# Patient Record
Sex: MALE | Race: WHITE | URBAN - METROPOLITAN AREA
[De-identification: names, ages, dates, MRNs, and addresses within clinical notes are randomized per-mention and may not be internally consistent; named-entity substitution may affect disease eponyms.]

---

## 2017-10-03 ENCOUNTER — HISTORICAL (OUTPATIENT)
Dept: ADMINISTRATIVE | Facility: HOSPITAL | Age: 47
End: 2017-10-03

## 2017-10-03 LAB
ABS NEUT (OLG): 2.44 X10(3)/MCL (ref 2.1–9.2)
ALBUMIN SERPL-MCNC: 4.7 GM/DL (ref 3.4–5)
ALBUMIN/GLOB SERPL: 1.6 {RATIO}
ALP SERPL-CCNC: 77 UNIT/L (ref 50–136)
ALT SERPL-CCNC: 35 UNIT/L (ref 12–78)
APPEARANCE, UA: CLEAR
AST SERPL-CCNC: 23 UNIT/L (ref 15–37)
BACTERIA SPEC CULT: NORMAL /HPF
BASOPHILS # BLD AUTO: 0 X10(3)/MCL (ref 0–0.2)
BASOPHILS NFR BLD AUTO: 0 %
BILIRUB SERPL-MCNC: 0.7 MG/DL (ref 0.2–1)
BILIRUB UR QL STRIP: NEGATIVE
BILIRUBIN DIRECT+TOT PNL SERPL-MCNC: 0.1 MG/DL (ref 0–0.2)
BILIRUBIN DIRECT+TOT PNL SERPL-MCNC: 0.6 MG/DL (ref 0–0.8)
BUN SERPL-MCNC: 22 MG/DL (ref 7–18)
CALCIUM SERPL-MCNC: 9.6 MG/DL (ref 8.5–10.1)
CHLORIDE SERPL-SCNC: 104 MMOL/L (ref 98–107)
CHOLEST SERPL-MCNC: 230 MG/DL (ref 0–200)
CHOLEST/HDLC SERPL: 4 {RATIO} (ref 0–5)
CO2 SERPL-SCNC: 29 MMOL/L (ref 21–32)
COLOR UR: YELLOW
CREAT SERPL-MCNC: 0.92 MG/DL (ref 0.7–1.3)
EOSINOPHIL # BLD AUTO: 0.1 X10(3)/MCL (ref 0–0.9)
EOSINOPHIL NFR BLD AUTO: 1 %
ERYTHROCYTE [DISTWIDTH] IN BLOOD BY AUTOMATED COUNT: 12.1 % (ref 11.5–17)
GLOBULIN SER-MCNC: 2.9 GM/DL (ref 2.4–3.5)
GLUCOSE (UA): NEGATIVE
GLUCOSE SERPL-MCNC: 94 MG/DL (ref 74–106)
HCT VFR BLD AUTO: 51.2 % (ref 42–52)
HDLC SERPL-MCNC: 57 MG/DL (ref 35–60)
HGB BLD-MCNC: 17.8 GM/DL (ref 14–18)
HGB UR QL STRIP: NEGATIVE
KETONES UR QL STRIP: NEGATIVE
LDLC SERPL CALC-MCNC: 158 MG/DL (ref 0–129)
LEUKOCYTE ESTERASE UR QL STRIP: NEGATIVE
LYMPHOCYTES # BLD AUTO: 1.6 X10(3)/MCL (ref 0.6–4.6)
LYMPHOCYTES NFR BLD AUTO: 35 %
MCH RBC QN AUTO: 30.3 PG (ref 27–31)
MCHC RBC AUTO-ENTMCNC: 34.8 GM/DL (ref 33–36)
MCV RBC AUTO: 87.1 FL (ref 80–94)
MONOCYTES # BLD AUTO: 0.4 X10(3)/MCL (ref 0.1–1.3)
MONOCYTES NFR BLD AUTO: 8 %
NEUTROPHILS # BLD AUTO: 2.44 X10(3)/MCL (ref 1.4–7.9)
NEUTROPHILS NFR BLD AUTO: 54 %
NITRITE UR QL STRIP: NEGATIVE
PH UR STRIP: 5.5 [PH] (ref 5–9)
PLATELET # BLD AUTO: 156 X10(3)/MCL (ref 130–400)
PMV BLD AUTO: 10.3 FL (ref 9.4–12.4)
POTASSIUM SERPL-SCNC: 4.4 MMOL/L (ref 3.5–5.1)
PROT SERPL-MCNC: 7.6 GM/DL (ref 6.4–8.2)
PROT UR QL STRIP: NEGATIVE
RBC # BLD AUTO: 5.88 X10(6)/MCL (ref 4.7–6.1)
RBC #/AREA URNS HPF: NORMAL /[HPF]
SODIUM SERPL-SCNC: 139 MMOL/L (ref 136–145)
SP GR UR STRIP: 1.02 (ref 1–1.03)
SQUAMOUS EPITHELIAL, UA: NORMAL
TRIGL SERPL-MCNC: 74 MG/DL (ref 30–150)
TSH SERPL-ACNC: 0.76 MIU/ML (ref 0.36–3.74)
UROBILINOGEN UR STRIP-ACNC: 0.2
VLDLC SERPL CALC-MCNC: 15 MG/DL
WBC # SPEC AUTO: 4.5 X10(3)/MCL (ref 4.5–11.5)
WBC #/AREA URNS HPF: NORMAL /HPF

## 2022-04-10 ENCOUNTER — HISTORICAL (OUTPATIENT)
Dept: ADMINISTRATIVE | Facility: HOSPITAL | Age: 52
End: 2022-04-10

## 2022-04-26 VITALS
DIASTOLIC BLOOD PRESSURE: 89 MMHG | OXYGEN SATURATION: 100 % | HEIGHT: 67 IN | SYSTOLIC BLOOD PRESSURE: 132 MMHG | WEIGHT: 170 LBS | BODY MASS INDEX: 26.68 KG/M2

## 2024-08-08 ENCOUNTER — HOSPITAL ENCOUNTER (EMERGENCY)
Facility: HOSPITAL | Age: 54
Discharge: HOME | End: 2024-08-08
Attending: FAMILY MEDICINE
Payer: COMMERCIAL

## 2024-08-08 VITALS
HEIGHT: 67 IN | WEIGHT: 160 LBS | DIASTOLIC BLOOD PRESSURE: 74 MMHG | TEMPERATURE: 97 F | SYSTOLIC BLOOD PRESSURE: 138 MMHG | RESPIRATION RATE: 18 BRPM | OXYGEN SATURATION: 99 % | BODY MASS INDEX: 25.11 KG/M2 | HEART RATE: 58 BPM

## 2024-08-08 DIAGNOSIS — N20.1 LEFT URETERAL STONE: Primary | ICD-10-CM

## 2024-08-08 LAB
AMPHET UR QL SCN: NEGATIVE
ANION GAP SERPL CALC-SCNC: 11 MEQ/L
BACTERIA #/AREA URNS AUTO: ABNORMAL /HPF
BARBITURATE SCN PRESENT UR: NEGATIVE
BASOPHILS # BLD AUTO: 0.01 X10(3)/MCL
BASOPHILS NFR BLD AUTO: 0.1 %
BENZODIAZ UR QL SCN: NEGATIVE
BILIRUB UR QL STRIP.AUTO: NEGATIVE
BUN SERPL-MCNC: 13.4 MG/DL (ref 8.4–25.7)
CALCIUM SERPL-MCNC: 10.7 MG/DL (ref 8.4–10.2)
CANNABINOIDS UR QL SCN: NEGATIVE
CHLORIDE SERPL-SCNC: 103 MMOL/L (ref 98–107)
CLARITY UR: CLEAR
CO2 SERPL-SCNC: 28 MMOL/L (ref 22–29)
COCAINE UR QL SCN: NEGATIVE
COLOR UR AUTO: YELLOW
CREAT SERPL-MCNC: 0.99 MG/DL (ref 0.73–1.18)
CREAT/UREA NIT SERPL: 14
CRP SERPL HS-MCNC: 1.47 MG/L
EOSINOPHIL # BLD AUTO: 0.01 X10(3)/MCL (ref 0–0.9)
EOSINOPHIL NFR BLD AUTO: 0.1 %
ERYTHROCYTE [DISTWIDTH] IN BLOOD BY AUTOMATED COUNT: 11.9 % (ref 11.5–17)
GFR SERPLBLD CREATININE-BSD FMLA CKD-EPI: >60 ML/MIN/1.73/M2
GLUCOSE SERPL-MCNC: 174 MG/DL (ref 74–100)
GLUCOSE UR QL STRIP: 100
HCT VFR BLD AUTO: 47.9 % (ref 42–52)
HGB BLD-MCNC: 16.1 G/DL (ref 14–18)
HGB UR QL STRIP: ABNORMAL
IMM GRANULOCYTES # BLD AUTO: 0.01 X10(3)/MCL (ref 0–0.04)
IMM GRANULOCYTES NFR BLD AUTO: 0.1 %
KETONES UR QL STRIP: ABNORMAL
LEUKOCYTE ESTERASE UR QL STRIP: NEGATIVE
LYMPHOCYTES # BLD AUTO: 0.58 X10(3)/MCL (ref 0.6–4.6)
LYMPHOCYTES NFR BLD AUTO: 6.2 %
MCH RBC QN AUTO: 29.1 PG (ref 27–31)
MCHC RBC AUTO-ENTMCNC: 33.6 G/DL (ref 33–36)
MCV RBC AUTO: 86.6 FL (ref 80–94)
MONOCYTES # BLD AUTO: 0.34 X10(3)/MCL (ref 0.1–1.3)
MONOCYTES NFR BLD AUTO: 3.6 %
NEUTROPHILS # BLD AUTO: 8.44 X10(3)/MCL (ref 2.1–9.2)
NEUTROPHILS NFR BLD AUTO: 89.9 %
NITRITE UR QL STRIP: NEGATIVE
OPIATES UR QL SCN: NEGATIVE
PCP UR QL: NEGATIVE
PH UR STRIP: 6 [PH]
PH UR: 6 [PH] (ref 3–11)
PLATELET # BLD AUTO: 166 X10(3)/MCL (ref 130–400)
PMV BLD AUTO: 9.9 FL (ref 7.4–10.4)
POTASSIUM SERPL-SCNC: 4.4 MMOL/L (ref 3.5–5.1)
PROT UR QL STRIP: NEGATIVE
RBC # BLD AUTO: 5.53 X10(6)/MCL (ref 4.7–6.1)
RBC #/AREA URNS AUTO: ABNORMAL /HPF
SODIUM SERPL-SCNC: 142 MMOL/L (ref 136–145)
SP GR UR STRIP.AUTO: 1.02 (ref 1–1.03)
SPECIFIC GRAVITY, URINE AUTO (.000) (OHS): 1.02 (ref 1–1.03)
SQUAMOUS #/AREA URNS AUTO: ABNORMAL /HPF
UROBILINOGEN UR STRIP-ACNC: 0.2
WBC # BLD AUTO: 9.39 X10(3)/MCL (ref 4.5–11.5)
WBC #/AREA URNS AUTO: ABNORMAL /HPF

## 2024-08-08 PROCEDURE — 80307 DRUG TEST PRSMV CHEM ANLYZR: CPT | Performed by: FAMILY MEDICINE

## 2024-08-08 PROCEDURE — 86141 C-REACTIVE PROTEIN HS: CPT | Performed by: FAMILY MEDICINE

## 2024-08-08 PROCEDURE — 81015 MICROSCOPIC EXAM OF URINE: CPT | Performed by: FAMILY MEDICINE

## 2024-08-08 PROCEDURE — 63600175 PHARM REV CODE 636 W HCPCS: Performed by: FAMILY MEDICINE

## 2024-08-08 PROCEDURE — 25000003 PHARM REV CODE 250: Performed by: FAMILY MEDICINE

## 2024-08-08 PROCEDURE — 80048 BASIC METABOLIC PNL TOTAL CA: CPT | Performed by: FAMILY MEDICINE

## 2024-08-08 PROCEDURE — 85025 COMPLETE CBC W/AUTO DIFF WBC: CPT | Performed by: FAMILY MEDICINE

## 2024-08-08 PROCEDURE — 99285 EMERGENCY DEPT VISIT HI MDM: CPT | Mod: 25

## 2024-08-08 PROCEDURE — 96374 THER/PROPH/DIAG INJ IV PUSH: CPT

## 2024-08-08 PROCEDURE — 96375 TX/PRO/DX INJ NEW DRUG ADDON: CPT

## 2024-08-08 PROCEDURE — 81003 URINALYSIS AUTO W/O SCOPE: CPT | Mod: 59 | Performed by: FAMILY MEDICINE

## 2024-08-08 PROCEDURE — 96361 HYDRATE IV INFUSION ADD-ON: CPT

## 2024-08-08 RX ORDER — TAMSULOSIN HYDROCHLORIDE 0.4 MG/1
0.4 CAPSULE ORAL DAILY
Qty: 10 CAPSULE | Refills: 0 | Status: SHIPPED | OUTPATIENT
Start: 2024-08-08 | End: 2025-08-08

## 2024-08-08 RX ORDER — ONDANSETRON 4 MG/1
4 TABLET, FILM COATED ORAL EVERY 6 HOURS
Qty: 12 TABLET | Refills: 0 | Status: SHIPPED | OUTPATIENT
Start: 2024-08-08

## 2024-08-08 RX ORDER — KETOROLAC TROMETHAMINE 30 MG/ML
30 INJECTION, SOLUTION INTRAMUSCULAR; INTRAVENOUS
Status: COMPLETED | OUTPATIENT
Start: 2024-08-08 | End: 2024-08-08

## 2024-08-08 RX ORDER — ONDANSETRON HYDROCHLORIDE 2 MG/ML
4 INJECTION, SOLUTION INTRAVENOUS
Status: COMPLETED | OUTPATIENT
Start: 2024-08-08 | End: 2024-08-08

## 2024-08-08 RX ORDER — KETOROLAC TROMETHAMINE 10 MG/1
10 TABLET, FILM COATED ORAL EVERY 6 HOURS
Qty: 15 TABLET | Refills: 0 | Status: SHIPPED | OUTPATIENT
Start: 2024-08-08 | End: 2024-08-13

## 2024-08-08 RX ADMIN — KETOROLAC TROMETHAMINE 30 MG: 30 INJECTION, SOLUTION INTRAMUSCULAR at 06:08

## 2024-08-08 RX ADMIN — SODIUM CHLORIDE 1000 ML: 9 INJECTION, SOLUTION INTRAVENOUS at 06:08

## 2024-08-08 RX ADMIN — ONDANSETRON 4 MG: 2 INJECTION INTRAMUSCULAR; INTRAVENOUS at 06:08

## 2024-08-08 NOTE — ED PROVIDER NOTES
Encounter Date: 8/8/2024       History     Chief Complaint   Patient presents with    Abdominal Pain     Patient c/o LLQ abdominal pain since yesterday. States he vomited 9 times since 11pm last night. Describes pain as stabbing and intermittent, rates pain 6/10 in triage. Took 1g Tylenol at 0530 this AM PTA. Denies fevers, dysuria and blood in urine.     54-year-old gentleman complains of severe left lower quad pain since yesterday also left flank pain pain has been colicky said vomiting x9 no fevers no chills no history of diverticulitis no history of kidney stones vital signs are stable on exam has no rebound no guarding some mild tenderness in his left flank differential would be renal colic from a kidney stone pyelo diverticulitis COVID and get some blood work CT stone pain control antiemetics IV        Review of patient's allergies indicates:  No Known Allergies  History reviewed. No pertinent past medical history.  History reviewed. No pertinent surgical history.  No family history on file.     Review of Systems   Constitutional:  Negative for fever.   HENT:  Negative for sore throat.    Respiratory:  Negative for shortness of breath.    Cardiovascular:  Negative for chest pain.   Gastrointestinal:  Positive for abdominal pain. Negative for nausea.   Genitourinary:  Positive for flank pain. Negative for dysuria.   Musculoskeletal:  Negative for back pain.   Skin:  Negative for rash.   Neurological:  Negative for weakness.   Hematological:  Does not bruise/bleed easily.   All other systems reviewed and are negative.      Physical Exam     Initial Vitals [08/08/24 0614]   BP Pulse Resp Temp SpO2   (!) 147/87 (!) 51 18 97.3 °F (36.3 °C) 98 %      MAP       --         Physical Exam    Nursing note and vitals reviewed.  Constitutional: He appears well-developed and well-nourished. He is active.   HENT:   Head: Normocephalic and atraumatic.   Eyes: Conjunctivae, EOM and lids are normal. Pupils are equal, round, and  UNM Children's Hospital Benjamín Gonzalez     reactive to light.   Neck: Trachea normal and phonation normal. Neck supple. No thyroid mass present.   Normal range of motion.  Cardiovascular:  Normal rate, regular rhythm, normal heart sounds and normal pulses.           Pulmonary/Chest: Breath sounds normal.   Abdominal: There is abdominal tenderness. There is no rebound and no guarding.   Musculoskeletal:         General: Normal range of motion.      Cervical back: Normal range of motion and neck supple.     Neurological: He is alert and oriented to person, place, and time. He has normal reflexes.   Skin: Skin is warm and intact.   Psychiatric: He has a normal mood and affect. His speech is normal and behavior is normal. Judgment and thought content normal. Cognition and memory are normal.         ED Course   Procedures  Labs Reviewed   URINALYSIS, REFLEX TO URINE CULTURE - Abnormal       Result Value    Color, UA Yellow      Appearance, UA Clear      Specific Gravity, UA 1.025      pH, UA 6.0      Protein, UA Negative      Glucose,  (*)     Ketones, UA Trace (*)     Blood, UA Large (*)     Bilirubin, UA Negative      Urobilinogen, UA 0.2      Nitrites, UA Negative      Leukocyte Esterase, UA Negative     BASIC METABOLIC PANEL - Abnormal    Sodium 142      Potassium 4.4      Chloride 103      CO2 28      Glucose 174 (*)     Blood Urea Nitrogen 13.4      Creatinine 0.99      BUN/Creatinine Ratio 14      Calcium 10.7 (*)     Anion Gap 11.0      eGFR >60     CBC WITH DIFFERENTIAL - Abnormal    WBC 9.39      RBC 5.53      Hgb 16.1      Hct 47.9      MCV 86.6      MCH 29.1      MCHC 33.6      RDW 11.9      Platelet 166      MPV 9.9      Neut % 89.9      Lymph % 6.2      Mono % 3.6      Eos % 0.1      Basophil % 0.1      Lymph # 0.58 (*)     Neut # 8.44      Mono # 0.34      Eos # 0.01      Baso # 0.01      IG# 0.01      IG% 0.1     URINALYSIS, MICROSCOPIC - Abnormal    Bacteria, UA None Seen      RBC, UA 50-99 (*)     WBC, UA None Seen      Squamous Epithelial  Cells, UA None Seen     HIGH SENSITIVITY CRP - Normal    C-Reactive Protein High Sensitivity 1.47     DRUG SCREEN, URINE (BEAKER) - Normal    Amphetamines, Urine Negative      Barbiturates, Urine Negative      Benzodiazepine, Urine Negative      Cannabinoids, Urine Negative      Cocaine, Urine Negative      Opiates, Urine Negative      Phencyclidine, Urine Negative      pH, Urine 6.0      Specific Gravity, Urine Auto 1.025      Narrative:     Cut off concentrations:    Amphetamines - 1000 ng/ml  Barbiturates - 200 ng/ml  Benzodiazepine - 200 ng/ml  Cannabinoids (THC) - 50 ng/ml  Cocaine - 300 ng/ml  Fentanyl - 1.0 ng/ml  MDMA - 500 ng/ml  Opiates - 300 ng/ml   Phencyclidine (PCP) - 25 ng/ml    Specimen submitted for drug analysis and tested for pH and specific gravity in order to evaluate sample integrity. Suspect tampering if specific gravity is <1.003 and/or pH is not within the range of 4.5 - 8.0  False negatives may result form substances such as bleach added to urine.  False positives may result for the presence of a substance with similar chemical structure to the drug or its metabolite.    This test provides only a PRELIMINARY analytical test result. A more specific alternate chemical method must be used in order to obtain a confirmed analytical result. Gas chromatography/mass spectrometry (GC/MS) is the preferred confirmatory method. Other chemical confirmation methods are available. Clinical consideration and professional judgement should be applied to any drug of abuse test result, particularly when preliminary positive results are used.    Positive results will be confirmed only at the physicians request. Unconfirmed screening results are to be used only for medical purposes (treatment).        CBC W/ AUTO DIFFERENTIAL    Narrative:     The following orders were created for panel order CBC Auto Differential.  Procedure                               Abnormality         Status                     ---------                                -----------         ------                     CBC with Differential[[id number]]       Abnormal            Final result                 Please view results for these tests on the individual orders.          Imaging Results              CT Renal Stone Study ABD Pelvis WO (Final result)  Result time 08/08/24 07:10:26      Final result by Zakiya Batres MD (08/08/24 07:10:26)                   Impression:      1. 3 mm calculus in the distal left ureter with mild left hydronephrosis and perinephric inflammatory changes.  2. Bowel containing right inguinal hernia without obstruction.      Electronically signed by: Zakiya Batres  Date:    08/08/2024  Time:    07:10               Narrative:    EXAMINATION:  CT RENAL STONE STUDY ABD PELVIS WO    CLINICAL HISTORY:  Flank pain, kidney stone suspected;    TECHNIQUE:  CT imaging was performed of the abdomen and pelvis without intravenous contrast. Dose length product is 484 mGycm. Automatic exposure control, adjustment of mA/kV or iterative reconstruction technique was used to limit radiation dose.    COMPARISON:  None    FINDINGS:  Assessment of the visceral organs and vasculature is limited by the lack of IV contrast.    Liver/biliary: No concerning hepatic findings. No radiodense gallstone or biliary dilatation appreciated.    Pancreas: Normal.    Spleen: Normal.    Adrenals: Normal.    Kidneys, ureters and bladder: There is a 3 mm calculus in the distal left ureter.  There is mild left hydronephrosis with perinephric stranding.  The bladder is within normal limits.    Reproductive organs: No pelvic masses.    Stomach/bowel: No evidence of bowel obstruction. Normal appendix. No discernible bowel inflammation.    Lymph nodes: No pathologically enlarged lymph node identified with noncontrast technique.    Peritoneum: No ascites or free air.    Vessels: Mild scattered vascular calcifications.    Abdominal wall: Right inguinal hernia  containing a loop of the terminal ileum.    Lung bases: No consolidation or pleural effusion.    Bones: No acute osseous findings.                                       Medications   sodium chloride 0.9% bolus 1,000 mL 1,000 mL (1,000 mLs Intravenous New Bag 8/8/24 0640)   ondansetron injection 4 mg (4 mg Intravenous Given 8/8/24 0640)   ketorolac injection 30 mg (30 mg Intravenous Given 8/8/24 0641)     Medical Decision Making  54-year-old gentleman complains of severe left lower quad pain since yesterday also left flank pain pain has been colicky said vomiting x9 no fevers no chills no history of diverticulitis no history of kidney stones vital signs are stable on exam has no rebound no guarding some mild tenderness in his left flank differential would be renal colic from a kidney stone pyelo diverticulitis COVID and get some blood work CT stone pain control antiemetics IV      Discussed all findings and treatment plan with the patient    Amount and/or Complexity of Data Reviewed  Labs: ordered. Decision-making details documented in ED Course.  Radiology: ordered and independent interpretation performed.    Risk  Prescription drug management.  Risk Details: Differential diagnosis diverticulitis kidney stone pyelo obstruction               ED Course as of 08/08/24 0755   Thu Aug 08, 2024   0639 NITRITE UA: Negative [BL]   0639 Leukocyte Esterase, UA: Negative [BL]   0639 WBC: 9.39 [BL]   0639 RBC: 5.53 [BL]   0639 Hematocrit: 47.9 [BL]   0643 CRP, High Sensitivity: 1.47 [BL]   0643 Glucose(!): 174 [BL]   0643 BUN: 13.4 [BL]   0643 Creatinine: 0.99 [BL]      ED Course User Index  [BL] Chidi Head MD                           Clinical Impression:  Final diagnoses:  [N20.1] Left ureteral stone (Primary)          ED Disposition Condition    Discharge Stable          ED Prescriptions       Medication Sig Dispense Start Date End Date Auth. Provider    ketorolac (TORADOL) 10 mg tablet Take 1 tablet (10 mg total)  by mouth every 6 (six) hours. for 5 days 15 tablet 8/8/2024 8/13/2024 Chidi Head MD    ondansetron (ZOFRAN) 4 MG tablet Take 1 tablet (4 mg total) by mouth every 6 (six) hours. 12 tablet 8/8/2024 -- Chidi Head MD    tamsulosin (FLOMAX) 0.4 mg Cap Take 1 capsule (0.4 mg total) by mouth once daily. 10 capsule 8/8/2024 8/8/2025 Chidi Head MD          Follow-up Information       Follow up With Specialties Details Why Contact Info    Mirlande Zarate, [facility] In 2 days  [address] [facility] [place] [address]  [phone]               Chidi Head MD  08/08/24 4726

## 2024-09-01 ENCOUNTER — HOSPITAL ENCOUNTER (EMERGENCY)
Facility: HOSPITAL | Age: 54
Discharge: ANOTHER HOSPITAL | End: 2024-09-01
Attending: EMERGENCY MEDICINE
Payer: COMMERCIAL

## 2024-09-01 ENCOUNTER — HOSPITAL ENCOUNTER (INPATIENT)
Facility: HOSPITAL | Age: 54
LOS: 5 days | Discharge: HOME | DRG: 356 | End: 2024-09-06
Attending: INTERNAL MEDICINE | Admitting: INTERNAL MEDICINE
Payer: COMMERCIAL

## 2024-09-01 VITALS
TEMPERATURE: 99 F | BODY MASS INDEX: 25.9 KG/M2 | HEIGHT: 67 IN | HEART RATE: 102 BPM | DIASTOLIC BLOOD PRESSURE: 76 MMHG | RESPIRATION RATE: 16 BRPM | WEIGHT: 165 LBS | OXYGEN SATURATION: 98 % | SYSTOLIC BLOOD PRESSURE: 114 MMHG

## 2024-09-01 DIAGNOSIS — K62.5 RECTAL BLEEDING: Primary | ICD-10-CM

## 2024-09-01 DIAGNOSIS — K92.2 GIB (GASTROINTESTINAL BLEEDING): ICD-10-CM

## 2024-09-01 DIAGNOSIS — D62 ACUTE BLOOD LOSS ANEMIA: Primary | ICD-10-CM

## 2024-09-01 LAB
ABORH RETYPE: NORMAL
ALBUMIN SERPL-MCNC: 3.6 G/DL (ref 3.5–5)
ALBUMIN/GLOB SERPL: 1.7 RATIO (ref 1.1–2)
ALP SERPL-CCNC: 48 UNIT/L (ref 40–150)
ALT SERPL-CCNC: 15 UNIT/L (ref 0–55)
ANION GAP SERPL CALC-SCNC: 9 MEQ/L
AST SERPL-CCNC: 14 UNIT/L (ref 5–34)
BASOPHILS # BLD AUTO: 0.03 X10(3)/MCL
BASOPHILS NFR BLD AUTO: 0.4 %
BILIRUB SERPL-MCNC: 0.4 MG/DL
BUN SERPL-MCNC: 35.9 MG/DL (ref 8.4–25.7)
CALCIUM SERPL-MCNC: 8.9 MG/DL (ref 8.4–10.2)
CHLORIDE SERPL-SCNC: 108 MMOL/L (ref 98–107)
CO2 SERPL-SCNC: 22 MMOL/L (ref 22–29)
COLOR STL: ABNORMAL
CONSISTENCY STL: ABNORMAL
CREAT SERPL-MCNC: 0.86 MG/DL (ref 0.73–1.18)
CREAT/UREA NIT SERPL: 42
EOSINOPHIL # BLD AUTO: 0.01 X10(3)/MCL (ref 0–0.9)
EOSINOPHIL NFR BLD AUTO: 0.1 %
ERYTHROCYTE [DISTWIDTH] IN BLOOD BY AUTOMATED COUNT: 12.4 % (ref 11.5–17)
GFR SERPLBLD CREATININE-BSD FMLA CKD-EPI: >60 ML/MIN/1.73/M2
GLOBULIN SER-MCNC: 2.1 GM/DL (ref 2.4–3.5)
GLUCOSE SERPL-MCNC: 132 MG/DL (ref 74–100)
GROUP & RH: NORMAL
GROUP & RH: NORMAL
HCT VFR BLD AUTO: 21.2 % (ref 42–52)
HCT VFR BLD AUTO: 29.2 % (ref 42–52)
HEMOCCULT SP1 STL QL: POSITIVE
HGB BLD-MCNC: 7.2 G/DL (ref 14–18)
HGB BLD-MCNC: 9.6 G/DL (ref 14–18)
IMM GRANULOCYTES # BLD AUTO: 0.01 X10(3)/MCL (ref 0–0.04)
IMM GRANULOCYTES NFR BLD AUTO: 0.1 %
INDIRECT COOMBS: NORMAL
INDIRECT COOMBS: NORMAL
INR PPP: 1.1
LYMPHOCYTES # BLD AUTO: 2.12 X10(3)/MCL (ref 0.6–4.6)
LYMPHOCYTES NFR BLD AUTO: 26 %
MCH RBC QN AUTO: 29.1 PG (ref 27–31)
MCHC RBC AUTO-ENTMCNC: 32.9 G/DL (ref 33–36)
MCV RBC AUTO: 88.5 FL (ref 80–94)
MONOCYTES # BLD AUTO: 0.46 X10(3)/MCL (ref 0.1–1.3)
MONOCYTES NFR BLD AUTO: 5.7 %
NEUTROPHILS # BLD AUTO: 5.51 X10(3)/MCL (ref 2.1–9.2)
NEUTROPHILS NFR BLD AUTO: 67.7 %
PLATELET # BLD AUTO: 219 X10(3)/MCL (ref 130–400)
PMV BLD AUTO: 10.3 FL (ref 7.4–10.4)
POCT GLUCOSE: 192 MG/DL (ref 70–110)
POTASSIUM SERPL-SCNC: 3.7 MMOL/L (ref 3.5–5.1)
PROT SERPL-MCNC: 5.7 GM/DL (ref 6.4–8.3)
PROTHROMBIN TIME: 10.9 SECONDS (ref 12.5–14.5)
RBC # BLD AUTO: 3.3 X10(6)/MCL (ref 4.7–6.1)
SODIUM SERPL-SCNC: 139 MMOL/L (ref 136–145)
SPECIMEN OUTDATE: NORMAL
SPECIMEN OUTDATE: NORMAL
WBC # BLD AUTO: 8.14 X10(3)/MCL (ref 4.5–11.5)

## 2024-09-01 PROCEDURE — 85610 PROTHROMBIN TIME: CPT | Performed by: EMERGENCY MEDICINE

## 2024-09-01 PROCEDURE — 80053 COMPREHEN METABOLIC PANEL: CPT | Performed by: EMERGENCY MEDICINE

## 2024-09-01 PROCEDURE — 36415 COLL VENOUS BLD VENIPUNCTURE: CPT | Performed by: INTERNAL MEDICINE

## 2024-09-01 PROCEDURE — P9016 RBC LEUKOCYTES REDUCED: HCPCS | Performed by: INTERNAL MEDICINE

## 2024-09-01 PROCEDURE — 86901 BLOOD TYPING SEROLOGIC RH(D): CPT | Mod: 91 | Performed by: INTERNAL MEDICINE

## 2024-09-01 PROCEDURE — 86900 BLOOD TYPING SEROLOGIC ABO: CPT | Mod: 91 | Performed by: INTERNAL MEDICINE

## 2024-09-01 PROCEDURE — 96361 HYDRATE IV INFUSION ADD-ON: CPT

## 2024-09-01 PROCEDURE — 82270 OCCULT BLOOD FECES: CPT | Performed by: STUDENT IN AN ORGANIZED HEALTH CARE EDUCATION/TRAINING PROGRAM

## 2024-09-01 PROCEDURE — 86923 COMPATIBILITY TEST ELECTRIC: CPT | Performed by: INTERNAL MEDICINE

## 2024-09-01 PROCEDURE — 30233N1 TRANSFUSION OF NONAUTOLOGOUS RED BLOOD CELLS INTO PERIPHERAL VEIN, PERCUTANEOUS APPROACH: ICD-10-PCS | Performed by: INTERNAL MEDICINE

## 2024-09-01 PROCEDURE — 85025 COMPLETE CBC W/AUTO DIFF WBC: CPT | Performed by: EMERGENCY MEDICINE

## 2024-09-01 PROCEDURE — 85014 HEMATOCRIT: CPT | Performed by: INTERNAL MEDICINE

## 2024-09-01 PROCEDURE — 11000001 HC ACUTE MED/SURG PRIVATE ROOM

## 2024-09-01 PROCEDURE — 63600175 PHARM REV CODE 636 W HCPCS: Performed by: STUDENT IN AN ORGANIZED HEALTH CARE EDUCATION/TRAINING PROGRAM

## 2024-09-01 PROCEDURE — 96374 THER/PROPH/DIAG INJ IV PUSH: CPT

## 2024-09-01 PROCEDURE — 36430 TRANSFUSION BLD/BLD COMPNT: CPT

## 2024-09-01 PROCEDURE — 63600175 PHARM REV CODE 636 W HCPCS: Performed by: INTERNAL MEDICINE

## 2024-09-01 PROCEDURE — 25500020 PHARM REV CODE 255: Performed by: EMERGENCY MEDICINE

## 2024-09-01 PROCEDURE — 99285 EMERGENCY DEPT VISIT HI MDM: CPT | Mod: 25

## 2024-09-01 PROCEDURE — 86900 BLOOD TYPING SEROLOGIC ABO: CPT | Performed by: EMERGENCY MEDICINE

## 2024-09-01 RX ORDER — PANTOPRAZOLE SODIUM 40 MG/10ML
80 INJECTION, POWDER, LYOPHILIZED, FOR SOLUTION INTRAVENOUS
Status: COMPLETED | OUTPATIENT
Start: 2024-09-01 | End: 2024-09-01

## 2024-09-01 RX ORDER — ONDANSETRON HYDROCHLORIDE 2 MG/ML
4 INJECTION, SOLUTION INTRAVENOUS EVERY 8 HOURS PRN
Status: DISCONTINUED | OUTPATIENT
Start: 2024-09-02 | End: 2024-09-06 | Stop reason: HOSPADM

## 2024-09-01 RX ORDER — HYDROCODONE BITARTRATE AND ACETAMINOPHEN 500; 5 MG/1; MG/1
TABLET ORAL
Status: DISCONTINUED | OUTPATIENT
Start: 2024-09-01 | End: 2024-09-03

## 2024-09-01 RX ORDER — ONDANSETRON HYDROCHLORIDE 2 MG/ML
4 INJECTION, SOLUTION INTRAVENOUS EVERY 6 HOURS PRN
Status: DISCONTINUED | OUTPATIENT
Start: 2024-09-01 | End: 2024-09-06 | Stop reason: HOSPADM

## 2024-09-01 RX ORDER — SODIUM CHLORIDE 0.9 % (FLUSH) 0.9 %
10 SYRINGE (ML) INJECTION
Status: DISCONTINUED | OUTPATIENT
Start: 2024-09-02 | End: 2024-09-06 | Stop reason: HOSPADM

## 2024-09-01 RX ORDER — TALC
6 POWDER (GRAM) TOPICAL NIGHTLY PRN
Status: DISCONTINUED | OUTPATIENT
Start: 2024-09-02 | End: 2024-09-06 | Stop reason: HOSPADM

## 2024-09-01 RX ADMIN — ONDANSETRON 4 MG: 2 INJECTION INTRAMUSCULAR; INTRAVENOUS at 10:09

## 2024-09-01 RX ADMIN — IOHEXOL 100 ML: 350 INJECTION, SOLUTION INTRAVENOUS at 06:09

## 2024-09-01 RX ADMIN — SODIUM CHLORIDE, POTASSIUM CHLORIDE, SODIUM LACTATE AND CALCIUM CHLORIDE 1000 ML: 600; 310; 30; 20 INJECTION, SOLUTION INTRAVENOUS at 06:09

## 2024-09-01 RX ADMIN — PANTOPRAZOLE SODIUM 80 MG: 40 INJECTION, POWDER, LYOPHILIZED, FOR SOLUTION INTRAVENOUS at 06:09

## 2024-09-01 NOTE — ED PROVIDER NOTES
NAME:  Tony Zarate  CSN:     [id number]  MRN:    [id number]  ADMIT DATE: 9/1/2024        eMERGENCY dEPARTMENT eNCOUnter    CHIEF COMPLAINT    Chief Complaint   Patient presents with    Rectal Bleeding     Pt reports of passing out 3 x today, blood in stool.        HPI      Tony Zarate is a 54 y.o. male who presents to the ED for rectal bleeding.  Patient reports it started approximately 4:00 a.m. in the morning.  He states multiple episodes of bloody stool.  He also reports passing out 3 times a day.  He denies any fevers or abdominal pain.  He was not on blood thinners.  Patient was never experienced similar episode in the past.          ALLERGIES    Review of patient's allergies indicates:  No Known Allergies    PAST MEDICAL HISTORY  No past medical history on file.    SURGICAL HISTORY    No past surgical history on file.    SOCIAL HISTORY    Social History     Socioeconomic History    Marital status:        FAMILY HISTORY    No family history on file.    REVIEW OF SYSTEMS   Review of Systems   Constitutional:  Negative for chills, fever and weight loss.   HENT:  Negative for congestion and sinus pain.    Eyes:  Negative for blurred vision and double vision.   Respiratory:  Negative for cough and shortness of breath.    Cardiovascular:  Negative for chest pain, palpitations and leg swelling.   Gastrointestinal:  Positive for blood in stool. Negative for abdominal pain, constipation, diarrhea, heartburn, nausea and vomiting.   Genitourinary:  Negative for dysuria and flank pain.   Neurological:  Positive for loss of consciousness. Negative for dizziness, seizures and headaches.   Psychiatric/Behavioral:  Negative for depression and substance abuse. The patient is not nervous/anxious.      All Systems otherwise negative except as noted in the History of Present Illness.        PHYSICAL EXAM    Reviewed Triage Note  VITAL SIGNS:   ED Triage Vitals [09/01/24 1720]   Enc Vitals Group      /75       "Pulse 90      Resp 20      Temp 98.5 °F (36.9 °C)      Temp src       SpO2 100 %      Weight 165 lb      Height 5' 7"      Head Circumference       Peak Flow       Pain Score       Pain Loc       Pain Education       Exclude from Growth Chart        Patient Vitals for the past 24 hrs:   BP Temp Pulse Resp SpO2 Height Weight   09/01/24 2101 114/76 -- 102 16 98 % -- --   09/01/24 1902 108/75 -- 87 14 100 % -- --   09/01/24 1836 137/77 -- 90 14 100 % -- --   09/01/24 1743 108/80 -- 94 16 98 % -- --   09/01/24 1720 114/75 98.5 °F (36.9 °C) 90 20 100 % 5' 7" (1.702 m) 74.8 kg (165 lb)           Physical Exam  Vitals reviewed.   Constitutional:       General: He is not in acute distress.     Appearance: He is normal weight. He is not ill-appearing, toxic-appearing or diaphoretic.   HENT:      Head: Normocephalic.   Eyes:      Extraocular Movements: Extraocular movements intact.      Conjunctiva/sclera: Conjunctivae normal.   Cardiovascular:      Rate and Rhythm: Normal rate.      Heart sounds: Normal heart sounds. No murmur heard.     No friction rub. No gallop.   Pulmonary:      Effort: Pulmonary effort is normal. No respiratory distress.      Breath sounds: Normal breath sounds. No stridor. No wheezing, rhonchi or rales.   Abdominal:      General: There is no distension.      Palpations: Abdomen is soft.      Tenderness: There is no abdominal tenderness. There is no guarding.   Genitourinary:     Comments: Rectal exam performed by myself with NANCY Noel present as chaperone.  No external hemorrhoids appreciated.  No fissures appreciated.  No internal hemorrhoids palpated.  Musculoskeletal:      Cervical back: Normal range of motion.   Skin:     General: Skin is warm.      Findings: No rash.   Neurological:      Mental Status: He is alert.      Comments: GCS 15.         Constitutional:  Well-developed, well-nourished. No acute distress  HENT:  Normocephalic, atraumatic.  Eyes:  EOMI. Conjunctiva normal without discharge. "   Neck: Normal range of motion.No stridor. No meningismus.   Respiratory:  Normal breath sounds bilaterally.  No respiratory distress, retractions, or conversational dyspnea. No wheezing. No rhonchi. No rales.   Cardiovascular:  Normal heart rate. Normal rhythm. No pitting lower extremity edema.   GI:  Abdomen soft, non-distended, non-tender. Normal bowel sounds. No guarding, rigidity or rebound.    : No CVA tenderness.   Musculoskeletal:  No gross deformity or limited range of motion of all major joints. No palpable bony deformity. No tenderness to palpation.  Integument:  Warm and dry. No rash.  Neurologic:  Normal motor function. Normal sensory function. No focal deficits noted. Alert and Interactive.  Psychiatric:  Affect normal. Mood normal.         LABS  Pertinent labs reviewed. (See chart for details)   Labs Reviewed   COMPREHENSIVE METABOLIC PANEL - Abnormal       Result Value    Sodium 139      Potassium 3.7      Chloride 108 (*)     CO2 22      Glucose 132 (*)     Blood Urea Nitrogen 35.9 (*)     Creatinine 0.86      Calcium 8.9      Protein Total 5.7 (*)     Albumin 3.6      Globulin 2.1 (*)     Albumin/Globulin Ratio 1.7      Bilirubin Total 0.4      ALP 48      ALT 15      AST 14      eGFR >60      Anion Gap 9.0      BUN/Creatinine Ratio 42     PROTIME-INR - Abnormal    PT 10.9 (*)     INR 1.1     CBC WITH DIFFERENTIAL - Abnormal    WBC 8.14      RBC 3.30 (*)     Hgb 9.6 (*)     Hct 29.2 (*)     MCV 88.5      MCH 29.1      MCHC 32.9 (*)     RDW 12.4      Platelet 219      MPV 10.3      Neut % 67.7      Lymph % 26.0      Mono % 5.7      Eos % 0.1      Basophil % 0.4      Lymph # 2.12      Neut # 5.51      Mono # 0.46      Eos # 0.01      Baso # 0.03      IG# 0.01      IG% 0.1     OCCULT BLOOD STOOL, CA SCREEN - Abnormal    Stool Color 1 Brown      Stool Consistancy 1 soft      Occult Blood Stool 1 Positive (*)    CBC W/ AUTO DIFFERENTIAL    Narrative:     The following orders were created for panel  order CBC auto differential.  Procedure                               Abnormality         Status                     ---------                               -----------         ------                     CBC with Differential[[id number]]       Abnormal            Final result                 Please view results for these tests on the individual orders.   OCCULT BLOOD,STOOL,SCREEN (1-3)    Narrative:     The following orders were created for panel order Occult Blood, Stool Screening (1 -3).  Procedure                               Abnormality         Status                     ---------                               -----------         ------                     Occult Blood Stool, CA ...[[id number]]  Abnormal            Final result               OCCULT BLOOD STOOL, CA ...[[id number]]                                                   Please view results for these tests on the individual orders.   OCCULT BLOOD STOOL, CA SCREEN 2ND SPEC   TYPE & SCREEN    Group & Rh O POS      Indirect Lindsay GEL NEG      Specimen Outdate 09/04/2024 23:59     ABORH RETYPE    ABORH Retype O POS           RADIOLOGY    Imaging Results              CT Abdomen Pelvis With IV Contrast NO Oral Contrast (Final result)  Result time 09/01/24 19:14:23      Final result by Dominic Fields MD (09/01/24 19:14:23)                   Impression:      No acute intra-abdominal intrapelvic abnormality.    Bowel containing right inguinal hernia without evidence for obstruction or inflammatory changes.      Electronically signed by: Dominic Fields MD  Date:    09/01/2024  Time:    19:14               Narrative:    EXAMINATION:  CT of the abdomen pelvis with contrast    CLINICAL HISTORY:  Rectal bleeding    COMPARISON:  08/08/2024    TECHNIQUE:  Routine CT of the abdomen pelvis performed with intravenous contrast    Total DLP: 447.45  mGy.cm    Automatic exposure control was utilized to reduce the patient's dose    FINDINGS:  The visualized lung bases are  clear.    The liver, spleen, pancreas, gallbladder, adrenal glands, and right kidney are unremarkable.  There is a simple cyst in the inferior pole of the left kidney that measures 0.9 cm.  No hydronephrosis.  No perinephric collection.  The abdominal aorta normal caliber.  No abdominopelvic adenopathy.  There is right inguinal hernia containing portion of small bowel loops without evidence for high-grade obstruction or bowel inflammatory changes.  No free air, free fluid, fluid collection.  Bladder contours are normal.    No osseous destructive process.                                      PROCEDURES    Critical Care    Date/Time: 9/1/2024 7:54 PM    Performed by: Chepe Trimble MD  Authorized by: Arias Sykes MD  Direct patient critical care time: 10 minutes  Additional history critical care time: 10 minutes  Ordering / reviewing critical care time: 10 minutes  Documentation critical care time: 10 minutes  Consulting other physicians critical care time: 10 minutes  Total critical care time (exclusive of procedural time) : 50 minutes  Critical care was time spent personally by me on the following activities: blood draw for specimens, development of treatment plan with patient or surrogate, discussions with consultants, evaluation of patient's response to treatment, examination of patient, obtaining history from patient or surrogate, ordering and performing treatments and interventions, pulse oximetry, ordering and review of radiographic studies, ordering and review of laboratory studies and re-evaluation of patient's condition.            EKG     Interpreted by ERP:     EKG Readings: (Independently Interpreted)   Rhythm: Normal Sinus Rhythm. Heart Rate: 91. Ectopy: No Ectopy. Conduction: Normal. ST Segments: Normal ST Segments. T Waves: Normal. Clinical Impression: Normal Sinus Rhythm       ED COURSE & MEDICAL DECISION MAKING    Pertinent & Imaging studies reviewed. (See chart for details and specific  orders.)        Medications   pantoprazole injection 80 mg (80 mg Intravenous Given 9/1/24 1824)   lactated ringers bolus 1,000 mL (0 mLs Intravenous Stopped 9/1/24 1934)   iohexoL (OMNIPAQUE 350) injection 100 mL (100 mLs Intravenous Given 9/1/24 1828)       ED Course as of 09/01/24 2115   Sun Sep 01, 2024 1813 This patient was handed off to me at 6:00 p.m. for GI bleed and a 7 point drop in hemoglobin over the last 3 weeks.  Patient was reported to have a syncopal episode today.  EKG shows no dysrhythmias or signs of ischemic change.  Basic labs showed hemoglobin of 9.6 and hematocrit 29.2.  No indications for immediate transfusion.  Patient otherwise hemodynamically stable.  L of fluids were added when I came on as well as a fecal occult was collected.  No external hemorrhoids were appreciated no fissures.  Patient overall appearing well at this time with a GCS 15 [JS]   1826 Patient reports never having had a C scope in the past.  Patient does not have a GI doctor. [JS]   1948 FOBT positive.  CT scan showed no acute findings overt signs contrast within the lumen of the bowels. [JS]   1954 Patient accepted via practitioner Ramonita working with Dr susan may to [facility] [JS]      ED Course User Index  [JS] Chepe Trimble MD   Patient with a significant drop in his hemoglobin compared to 3 weeks ago.  He also experienced a few episodes of syncope today.  He will be transferred for GI evaluation         DISPOSITION  Patient transferred in stable condition at No discharge date for patient encounter.        FINAL IMPRESSION    1. Rectal bleeding              Critical care time spent with this patient (not including separately billable items) was  0 minutes.     DISCLAIMER: This note was prepared with Dragon NaturallySpeaking voice recognition transcription software. Garbled syntax, mangled pronouns, and other bizarre constructions may be attributed to that software system.      Chepe Trimble MD  09/01/2024  5:49 PM            Chepe Trimble MD  09/01/24 1955       Chepe Trimble MD  09/01/24 2115

## 2024-09-02 ENCOUNTER — ANESTHESIA (OUTPATIENT)
Dept: ENDOSCOPY | Facility: HOSPITAL | Age: 54
DRG: 356 | End: 2024-09-02
Payer: COMMERCIAL

## 2024-09-02 ENCOUNTER — ANESTHESIA EVENT (OUTPATIENT)
Dept: ENDOSCOPY | Facility: HOSPITAL | Age: 54
DRG: 356 | End: 2024-09-02
Payer: COMMERCIAL

## 2024-09-02 LAB
ABO + RH BLD: NORMAL
ALBUMIN SERPL-MCNC: 2.9 G/DL (ref 3.5–5)
ALBUMIN/GLOB SERPL: 2.1 RATIO (ref 1.1–2)
ALLENS TEST BLOOD GAS (OHS): ABNORMAL
ALLENS TEST BLOOD GAS (OHS): YES
ALP SERPL-CCNC: 37 UNIT/L (ref 40–150)
ALT SERPL-CCNC: 11 UNIT/L (ref 0–55)
ANION GAP SERPL CALC-SCNC: 9 MEQ/L
APTT PPP: 24 SECONDS (ref 23.2–33.7)
AST SERPL-CCNC: 12 UNIT/L (ref 5–34)
BASE EXCESS BLD CALC-SCNC: 2 MMOL/L
BASE EXCESS BLD CALC-SCNC: 2.6 MMOL/L
BASOPHILS # BLD AUTO: 0.01 X10(3)/MCL
BASOPHILS NFR BLD AUTO: 0.1 %
BILIRUB SERPL-MCNC: 0.5 MG/DL
BLD PROD TYP BPU: NORMAL
BLOOD GAS SAMPLE TYPE (OHS): ABNORMAL
BLOOD GAS SAMPLE TYPE (OHS): ABNORMAL
BLOOD UNIT EXPIRATION DATE: NORMAL
BLOOD UNIT TYPE CODE: 5100
BLOOD UNIT TYPE CODE: 9500
BUN SERPL-MCNC: 31.3 MG/DL (ref 8.4–25.7)
CA-I BLD-SCNC: 1.05 MMOL/L (ref 1.12–1.23)
CA-I BLD-SCNC: 1.06 MMOL/L (ref 1.12–1.23)
CALCIUM SERPL-MCNC: 8.1 MG/DL (ref 8.4–10.2)
CHLORIDE SERPL-SCNC: 109 MMOL/L (ref 98–107)
CO2 BLDA-SCNC: 28.5 MMOL/L
CO2 BLDA-SCNC: 29.7 MMOL/L
CO2 SERPL-SCNC: 22 MMOL/L (ref 22–29)
CREAT SERPL-MCNC: 0.75 MG/DL (ref 0.73–1.18)
CREAT/UREA NIT SERPL: 42
CROSSMATCH INTERPRETATION: NORMAL
DISPENSE STATUS: NORMAL
DRAWN BY BLOOD GAS (OHS): ABNORMAL
DRAWN BY BLOOD GAS (OHS): ABNORMAL
EOSINOPHIL # BLD AUTO: 0 X10(3)/MCL (ref 0–0.9)
EOSINOPHIL NFR BLD AUTO: 0 %
ERYTHROCYTE [DISTWIDTH] IN BLOOD BY AUTOMATED COUNT: 14.6 % (ref 11.5–17)
GFR SERPLBLD CREATININE-BSD FMLA CKD-EPI: >60 ML/MIN/1.73/M2
GLOBULIN SER-MCNC: 1.4 GM/DL (ref 2.4–3.5)
GLUCOSE SERPL-MCNC: 122 MG/DL (ref 74–100)
HCO3 BLDA-SCNC: 27.2 MMOL/L (ref 22–26)
HCO3 BLDA-SCNC: 28.2 MMOL/L (ref 22–26)
HCT VFR BLD AUTO: 20.4 % (ref 42–52)
HCT VFR BLD AUTO: 30.3 % (ref 42–52)
HGB BLD-MCNC: 10.5 G/DL (ref 14–18)
HGB BLD-MCNC: 7 G/DL (ref 14–18)
IMM GRANULOCYTES # BLD AUTO: 0.05 X10(3)/MCL (ref 0–0.04)
IMM GRANULOCYTES NFR BLD AUTO: 0.6 %
INHALED O2 CONCENTRATION: 55 %
INHALED O2 CONCENTRATION: 55 %
LYMPHOCYTES # BLD AUTO: 1.23 X10(3)/MCL (ref 0.6–4.6)
LYMPHOCYTES NFR BLD AUTO: 13.7 %
MCH RBC QN AUTO: 29.4 PG (ref 27–31)
MCHC RBC AUTO-ENTMCNC: 34.7 G/DL (ref 33–36)
MCV RBC AUTO: 84.9 FL (ref 80–94)
MECH RR (OHS): 20 B/MIN
MECH RR (OHS): 22 B/MIN
MODE (OHS): AC
MODE (OHS): AC
MONOCYTES # BLD AUTO: 0.64 X10(3)/MCL (ref 0.1–1.3)
MONOCYTES NFR BLD AUTO: 7.1 %
NEUTROPHILS # BLD AUTO: 7.07 X10(3)/MCL (ref 2.1–9.2)
NEUTROPHILS NFR BLD AUTO: 78.5 %
NRBC BLD AUTO-RTO: 0 %
OHS QRS DURATION: 76 MS
OHS QTC CALCULATION: 410 MS
PCO2 BLDA: 41 MMHG (ref 35–45)
PCO2 BLDA: 50 MMHG (ref 35–45)
PEEP RESPIRATORY: 8 CMH2O
PEEP RESPIRATORY: 8 CMH2O
PH BLDA: 7.36 [PH] (ref 7.35–7.45)
PH BLDA: 7.43 [PH] (ref 7.35–7.45)
PLATELET # BLD AUTO: 135 X10(3)/MCL (ref 130–400)
PLATELETS.RETICULATED NFR BLD AUTO: 6.8 % (ref 0.9–11.2)
PMV BLD AUTO: 10.9 FL (ref 7.4–10.4)
PO2 BLDA: 270 MMHG (ref 80–100)
PO2 BLDA: <38 MMHG (ref 80–100)
POTASSIUM BLOOD GAS (OHS): 4.1 MMOL/L (ref 3.5–5)
POTASSIUM BLOOD GAS (OHS): 4.6 MMOL/L (ref 3.5–5)
POTASSIUM SERPL-SCNC: 4.2 MMOL/L (ref 3.5–5.1)
PROT SERPL-MCNC: 4.3 GM/DL (ref 6.4–8.3)
RBC # BLD AUTO: 3.57 X10(6)/MCL (ref 4.7–6.1)
SAMPLE SITE BLOOD GAS (OHS): ABNORMAL
SAMPLE SITE BLOOD GAS (OHS): ABNORMAL
SAO2 % BLDA: 100 %
SAO2 % BLDA: 24 %
SODIUM BLOOD GAS (OHS): 134 MMOL/L (ref 137–145)
SODIUM BLOOD GAS (OHS): 134 MMOL/L (ref 137–145)
SODIUM SERPL-SCNC: 140 MMOL/L (ref 136–145)
SPONT+MECH VT ON VENT: 450 ML
SPONT+MECH VT ON VENT: 450 ML
UNIT NUMBER: NORMAL
WBC # BLD AUTO: 9 X10(3)/MCL (ref 4.5–11.5)

## 2024-09-02 PROCEDURE — D9220A PRA ANESTHESIA: Mod: CRNA,,,

## 2024-09-02 PROCEDURE — 27100171 HC OXYGEN HIGH FLOW UP TO 24 HOURS

## 2024-09-02 PROCEDURE — P9017 PLASMA 1 DONOR FRZ W/IN 8 HR: HCPCS | Performed by: INTERNAL MEDICINE

## 2024-09-02 PROCEDURE — 36415 COLL VENOUS BLD VENIPUNCTURE: CPT

## 2024-09-02 PROCEDURE — 63600175 PHARM REV CODE 636 W HCPCS

## 2024-09-02 PROCEDURE — 94002 VENT MGMT INPAT INIT DAY: CPT

## 2024-09-02 PROCEDURE — 94760 N-INVAS EAR/PLS OXIMETRY 1: CPT | Mod: XB

## 2024-09-02 PROCEDURE — 0BH17EZ INSERTION OF ENDOTRACHEAL AIRWAY INTO TRACHEA, VIA NATURAL OR ARTIFICIAL OPENING: ICD-10-PCS | Performed by: INTERNAL MEDICINE

## 2024-09-02 PROCEDURE — 5A1945Z RESPIRATORY VENTILATION, 24-96 CONSECUTIVE HOURS: ICD-10-PCS | Performed by: INTERNAL MEDICINE

## 2024-09-02 PROCEDURE — 25000003 PHARM REV CODE 250: Performed by: INTERNAL MEDICINE

## 2024-09-02 PROCEDURE — 85730 THROMBOPLASTIN TIME PARTIAL: CPT

## 2024-09-02 PROCEDURE — 99900035 HC TECH TIME PER 15 MIN (STAT)

## 2024-09-02 PROCEDURE — 80053 COMPREHEN METABOLIC PANEL: CPT

## 2024-09-02 PROCEDURE — 63600175 PHARM REV CODE 636 W HCPCS: Performed by: INTERNAL MEDICINE

## 2024-09-02 PROCEDURE — 99900026 HC AIRWAY MAINTENANCE (STAT)

## 2024-09-02 PROCEDURE — 20000000 HC ICU ROOM

## 2024-09-02 PROCEDURE — 99900031 HC PATIENT EDUCATION (STAT)

## 2024-09-02 PROCEDURE — 3E0G8TZ INTRODUCTION OF DESTRUCTIVE AGENT INTO UPPER GI, VIA NATURAL OR ARTIFICIAL OPENING ENDOSCOPIC: ICD-10-PCS | Performed by: INTERNAL MEDICINE

## 2024-09-02 PROCEDURE — D9220A PRA ANESTHESIA: Mod: ANES,,, | Performed by: ANESTHESIOLOGY

## 2024-09-02 PROCEDURE — 25000003 PHARM REV CODE 250

## 2024-09-02 PROCEDURE — P9016 RBC LEUKOCYTES REDUCED: HCPCS | Performed by: INTERNAL MEDICINE

## 2024-09-02 PROCEDURE — 82803 BLOOD GASES ANY COMBINATION: CPT

## 2024-09-02 PROCEDURE — P9035 PLATELET PHERES LEUKOREDUCED: HCPCS | Performed by: INTERNAL MEDICINE

## 2024-09-02 PROCEDURE — 85018 HEMOGLOBIN: CPT

## 2024-09-02 PROCEDURE — 0W3P8ZZ CONTROL BLEEDING IN GASTROINTESTINAL TRACT, VIA NATURAL OR ARTIFICIAL OPENING ENDOSCOPIC: ICD-10-PCS | Performed by: INTERNAL MEDICINE

## 2024-09-02 PROCEDURE — 37000008 HC ANESTHESIA 1ST 15 MINUTES: Performed by: INTERNAL MEDICINE

## 2024-09-02 PROCEDURE — 36600 WITHDRAWAL OF ARTERIAL BLOOD: CPT

## 2024-09-02 PROCEDURE — P9012 CRYOPRECIPITATE EACH UNIT: HCPCS | Performed by: INTERNAL MEDICINE

## 2024-09-02 PROCEDURE — 85014 HEMATOCRIT: CPT

## 2024-09-02 PROCEDURE — 27200966 HC CLOSED SUCTION SYSTEM

## 2024-09-02 PROCEDURE — 86923 COMPATIBILITY TEST ELECTRIC: CPT | Mod: 91 | Performed by: INTERNAL MEDICINE

## 2024-09-02 PROCEDURE — 99223 1ST HOSP IP/OBS HIGH 75: CPT | Mod: ,,, | Performed by: STUDENT IN AN ORGANIZED HEALTH CARE EDUCATION/TRAINING PROGRAM

## 2024-09-02 PROCEDURE — 43255 EGD CONTROL BLEEDING ANY: CPT | Performed by: INTERNAL MEDICINE

## 2024-09-02 PROCEDURE — 27201423 OPTIME MED/SURG SUP & DEVICES STERILE SUPPLY: Performed by: INTERNAL MEDICINE

## 2024-09-02 PROCEDURE — 85025 COMPLETE CBC W/AUTO DIFF WBC: CPT

## 2024-09-02 PROCEDURE — 37000009 HC ANESTHESIA EA ADD 15 MINS: Performed by: INTERNAL MEDICINE

## 2024-09-02 PROCEDURE — 51702 INSERT TEMP BLADDER CATH: CPT

## 2024-09-02 RX ORDER — HYDROCODONE BITARTRATE AND ACETAMINOPHEN 500; 5 MG/1; MG/1
TABLET ORAL
Status: DISCONTINUED | OUTPATIENT
Start: 2024-09-02 | End: 2024-09-03

## 2024-09-02 RX ORDER — NOREPINEPHRINE BITARTRATE/D5W 8 MG/250ML
0-3 PLASTIC BAG, INJECTION (ML) INTRAVENOUS CONTINUOUS
Status: DISCONTINUED | OUTPATIENT
Start: 2024-09-02 | End: 2024-09-05

## 2024-09-02 RX ORDER — PHENYLEPHRINE HYDROCHLORIDE 10 MG/ML
INJECTION INTRAVENOUS
Status: COMPLETED
Start: 2024-09-02 | End: 2024-09-02

## 2024-09-02 RX ORDER — PROPOFOL 10 MG/ML
0-50 INJECTION, EMULSION INTRAVENOUS CONTINUOUS
Status: DISCONTINUED | OUTPATIENT
Start: 2024-09-02 | End: 2024-09-05

## 2024-09-02 RX ORDER — MUPIROCIN 20 MG/G
OINTMENT TOPICAL 2 TIMES DAILY
Status: DISCONTINUED | OUTPATIENT
Start: 2024-09-04 | End: 2024-09-06 | Stop reason: HOSPADM

## 2024-09-02 RX ORDER — PROPOFOL 10 MG/ML
VIAL (ML) INTRAVENOUS
Status: DISCONTINUED | OUTPATIENT
Start: 2024-09-02 | End: 2024-09-02

## 2024-09-02 RX ORDER — LIDOCAINE HYDROCHLORIDE 20 MG/ML
INJECTION INTRAVENOUS
Status: DISCONTINUED | OUTPATIENT
Start: 2024-09-02 | End: 2024-09-02

## 2024-09-02 RX ORDER — SUCCINYLCHOLINE CHLORIDE 20 MG/ML
INJECTION INTRAMUSCULAR; INTRAVENOUS
Status: DISCONTINUED | OUTPATIENT
Start: 2024-09-02 | End: 2024-09-02

## 2024-09-02 RX ORDER — PHENYLEPHRINE HCL IN 0.9% NACL 1 MG/10 ML
SYRINGE (ML) INTRAVENOUS
Status: DISCONTINUED | OUTPATIENT
Start: 2024-09-02 | End: 2024-09-02

## 2024-09-02 RX ORDER — FENTANYL CITRATE 50 UG/ML
50 INJECTION, SOLUTION INTRAMUSCULAR; INTRAVENOUS
Status: DISCONTINUED | OUTPATIENT
Start: 2024-09-02 | End: 2024-09-06 | Stop reason: HOSPADM

## 2024-09-02 RX ORDER — SODIUM CHLORIDE, SODIUM LACTATE, POTASSIUM CHLORIDE, CALCIUM CHLORIDE 600; 310; 30; 20 MG/100ML; MG/100ML; MG/100ML; MG/100ML
INJECTION, SOLUTION INTRAVENOUS CONTINUOUS
Status: DISCONTINUED | OUTPATIENT
Start: 2024-09-02 | End: 2024-09-04

## 2024-09-02 RX ORDER — PROPOFOL 10 MG/ML
INJECTION, EMULSION INTRAVENOUS
Status: COMPLETED
Start: 2024-09-02 | End: 2024-09-02

## 2024-09-02 RX ADMIN — SODIUM CHLORIDE 8 MG/HR: 900 INJECTION INTRAVENOUS at 05:09

## 2024-09-02 RX ADMIN — PROPOFOL 40 MCG/KG/MIN: 10 INJECTION, EMULSION INTRAVENOUS at 07:09

## 2024-09-02 RX ADMIN — SODIUM CHLORIDE 8 MG/HR: 900 INJECTION INTRAVENOUS at 06:09

## 2024-09-02 RX ADMIN — Medication 100 MCG: at 11:09

## 2024-09-02 RX ADMIN — PROPOFOL 100 MG: 10 INJECTION, EMULSION INTRAVENOUS at 12:09

## 2024-09-02 RX ADMIN — NOREPINEPHRINE BITARTRATE 0.02 MCG/KG/MIN: 8 INJECTION, SOLUTION INTRAVENOUS at 01:09

## 2024-09-02 RX ADMIN — PROPOFOL 30 MCG/KG/MIN: 10 INJECTION, EMULSION INTRAVENOUS at 12:09

## 2024-09-02 RX ADMIN — Medication 100 MCG: at 12:09

## 2024-09-02 RX ADMIN — FENTANYL CITRATE 50 MCG: 50 INJECTION, SOLUTION INTRAMUSCULAR; INTRAVENOUS at 02:09

## 2024-09-02 RX ADMIN — PROPOFOL 100 MG: 10 INJECTION, EMULSION INTRAVENOUS at 11:09

## 2024-09-02 RX ADMIN — PROPOFOL 70 MG: 10 INJECTION, EMULSION INTRAVENOUS at 11:09

## 2024-09-02 RX ADMIN — SUCCINYLCHOLINE CHLORIDE 200 MG: 20 INJECTION, SOLUTION INTRAMUSCULAR; INTRAVENOUS at 12:09

## 2024-09-02 RX ADMIN — SODIUM CHLORIDE 8 MG/HR: 900 INJECTION INTRAVENOUS at 01:09

## 2024-09-02 RX ADMIN — SODIUM CHLORIDE 8 MG/HR: 900 INJECTION INTRAVENOUS at 11:09

## 2024-09-02 RX ADMIN — PHENYLEPHRINE HYDROCHLORIDE: 10 INJECTION INTRAVENOUS at 12:09

## 2024-09-02 RX ADMIN — PROPOFOL 50 MCG/KG/MIN: 10 INJECTION, EMULSION INTRAVENOUS at 02:09

## 2024-09-02 RX ADMIN — LIDOCAINE HYDROCHLORIDE 80 MG: 20 INJECTION INTRAVENOUS at 11:09

## 2024-09-02 RX ADMIN — SODIUM CHLORIDE, SODIUM GLUCONATE, SODIUM ACETATE, POTASSIUM CHLORIDE AND MAGNESIUM CHLORIDE: 526; 502; 368; 37; 30 INJECTION, SOLUTION INTRAVENOUS at 11:09

## 2024-09-02 RX ADMIN — SODIUM CHLORIDE, POTASSIUM CHLORIDE, SODIUM LACTATE AND CALCIUM CHLORIDE: 600; 310; 30; 20 INJECTION, SOLUTION INTRAVENOUS at 06:09

## 2024-09-02 RX ADMIN — PROPOFOL: 10 INJECTION, EMULSION INTRAVENOUS at 12:09

## 2024-09-02 NOTE — CONSULTS
Acute Care Surgery   Consult Note    Patient Name: Tony Zarate  YOB: 1970  Date: 09/02/2024 1:07 PM  Date of Admission: 9/1/2024  HD#1  POD#Day of Surgery    PRESENTING HISTORY   Chief Complaint/Reason for Admission: GIB (gastrointestinal bleeding)    History of Present Illness:  Patient is a 53 yo male with no significant PMHx who presented from OSH due to hematochezia and syncopal episode and anemia, and he was transferred for GI services. Patient received 3u rbcs yesterday with appropriate response in hgb from 7 to 10. GI performed bedside EGD today and found bleeding ulcer on duodenal bulb and cauterized it. Surgery consulted by GI bedside in the event that they are unable to control the bleeding or the patient becomes unstable from his GI bleed. Patient intubated, so unable to obtain history. Per chart review, patient denies  fever, chills, other prior episodes of hematemesis, abdominal pain, history of GERD, hematochezia. Denies risk factors such as NSAID use, alcohol use, and tobacco abuse. Denies any previous EGD or colonoscopy.     Review of Systems:  12 point ROS negative except as stated in HPI    PAST HISTORY:   Past medical history:  No past medical history on file.    Past surgical history:  No past surgical history on file.    Family history:  No family history on file.    Social history:  Social History     Socioeconomic History    Marital status:      Social History     Tobacco Use   Smoking Status Not on file   Smokeless Tobacco Not on file      Social History     Substance and Sexual Activity   Alcohol Use None        MEDICATIONS & ALLERGIES:     Current Facility-Administered Medications on File Prior to Encounter   Medication    [COMPLETED] iohexoL (OMNIPAQUE 350) injection 100 mL    [COMPLETED] lactated ringers bolus 1,000 mL    [COMPLETED] pantoprazole injection 80 mg     Current Outpatient Medications on File Prior to Encounter   Medication Sig    ondansetron  "(ZOFRAN) 4 MG tablet Take 1 tablet (4 mg total) by mouth every 6 (six) hours.    tamsulosin (FLOMAX) 0.4 mg Cap Take 1 capsule (0.4 mg total) by mouth once daily.       Allergies: Review of patient's allergies indicates:  No Known Allergies    Scheduled Meds:   [START ON 9/4/2024] mupirocin   Nasal BID       Continuous Infusions:   lactated ringers   Intravenous Continuous 125 mL/hr at 09/02/24 0635 New Bag at 09/02/24 0635    pantoprazole (PROTONIX) IV infusion  8 mg/hr Intravenous Continuous 20 mL/hr at 09/02/24 0537 8 mg/hr at 09/02/24 0537    propofoL  0-50 mcg/kg/min Intravenous Continuous 13.5 mL/hr at 09/02/24 1247 30 mcg/kg/min at 09/02/24 1247       PRN Meds:  Current Facility-Administered Medications:     0.9%  NaCl infusion (for blood administration), , Intravenous, Q24H PRN    0.9%  NaCl infusion (for blood administration), , Intravenous, Q24H PRN    0.9%  NaCl infusion (for blood administration), , Intravenous, Q24H PRN    0.9%  NaCl infusion (for blood administration), , Intravenous, Q24H PRN    0.9%  NaCl infusion (for blood administration), , Intravenous, Q24H PRN    0.9%  NaCl infusion (for blood administration), , Intravenous, Q24H PRN    0.9%  NaCl infusion (for blood administration), , Intravenous, Q24H PRN    melatonin, 6 mg, Oral, Nightly PRN    ondansetron, 4 mg, Intravenous, Q6H PRN    ondansetron, 4 mg, Intravenous, Q8H PRN    sodium chloride 0.9%, 10 mL, Intravenous, PRN    OBJECTIVE:   Vital Signs:  VITAL SIGNS: 24 HR MIN & MAX LAST   Temp  Min: 97.6 °F (36.4 °C)  Max: 98.7 °F (37.1 °C)  98.5 °F (36.9 °C)   BP  Min: 72/47  Max: 141/96  (!) 95/56    Pulse  Min: 72  Max: 129  74    Resp  Min: 12  Max: 28  19    SpO2  Min: 96 %  Max: 100 %  100 %      HT: 5' 7" (170.2 cm)  WT: 74.8 kg (165 lb)  BMI: 25.8     Intake/output:  Intake/Output - Last 3 Shifts         08/31 0700 09/01 0659 09/01 0700 09/02 0659 09/02 0700 09/03 0659    P.O.  0     I.V. (mL/kg)  74.7 (1)     Blood  1090.8     " "Total Intake(mL/kg)  1165.5 (15.6)     Net  +1165.5            Urine Occurrence  1 x 3 x    Stool Occurrence  1 x 3 x            Intake/Output Summary (Last 24 hours) at 9/2/2024 1307  Last data filed at 9/2/2024 0615  Gross per 24 hour   Intake 1165.51 ml   Output --   Net 1165.51 ml         Physical Exam:  General: intubated and sedated  CV: RR  Resp: intubated and mechanically ventilated  GI:  Abdomen soft, mildly distended  :  Deferred  MSK: No muscle atrophy, cyanosis, peripheral edema  Skin/wounds:  No rashes, ulcers, erythema  Neuro: sedated    Labs:  Troponin:  No results for input(s): "TROPONINI" in the last 72 hours.  CBC:  Recent Labs     09/01/24 1741 09/01/24 2239 09/02/24  0730   WBC 8.14  --  9.00   RBC 3.30*  --  3.57*   HGB 9.6*   < > 10.5*   HCT 29.2*   < > 30.3*     --  135   MCV 88.5  --  84.9   MCH 29.1  --  29.4   MCHC 32.9*  --  34.7    < > = values in this interval not displayed.     CMP:  Recent Labs     09/01/24 1741 09/02/24  0730   CALCIUM 8.9 8.1*   ALBUMIN 3.6 2.9*    140   K 3.7 4.2   CO2 22 22   * 109*   BUN 35.9* 31.3*   CREATININE 0.86 0.75   ALKPHOS 48 37*   ALT 15 11   AST 14 12   BILITOT 0.4 0.5         ASSESSMENT & PLAN:    53 yo male with no significant PMHx who presented from OSH due to hematochezia and syncopal episode found to have bleeding ulcer in duodenal bulb on EGD    - No acute surgical intervention indicated  - GI performing EGD at bedside at time of evaluation with apparent improvement in bleeding from ulcer  - Will follow up final results of EGD  - Recommend serial CBC   - Transfuse hgb < 7  - Recommend keeping patient intubated for 24 hours  - Continue PPI gtt  - If patient continues to have refractory bleeding despite endoscopic interventions, next step would be IR consult for possible embolization. Surgery would be reserved if unable to control bleeding with endoscopy or embolization  - Surgery will continue to follow  - Rest of care per " primary team    Debbie Negro MD  [employer] General Surgery PGY-2

## 2024-09-02 NOTE — ANESTHESIA PREPROCEDURE EVALUATION
"                                                                                                             09/02/2024  Tony Zarate is a 54 y.o., male with acute GI bleed and anemia for EGD.  He has been transfused PRBC.  No significant PMH.  He was in normal state of health prior to this episode, active, easily capable of greater than 4 mets.    "Patient is a 54-year-old male with no significant past medical history who presented to an outside ER with a complaint of bloody bowel movements multi multiple times over the last 1 day with an associated syncopal episode.  At the prior facility, workup showed a drop in his hemoglobin from 16 to 9 and patient was transferred to [facility] for GI services.  CT abdomen displayed no acute pathology.  On arrival to our facility, patient had an episode of hematemesis (approximately 15 cc according to nursing staff) and another syncopal episode.  Patient states that he has been noticing multiple episodes of black/tarry stools over the last day.  He denies fever, chills, other prior episodes of hematemesis, abdominal pain, history of GERD, hematochezia.  Patient denies risk factors such as NSAID use, alcohol use, and tobacco abuse.  Patient denies history of previous GI bleed or GI pathology in the past.  Patient states that he has never underwent EGD or colonoscopy in the past.       Patient was evaluated by GI who recommended ICU admission.  Patient was started on IV PPI infusion and transfused 3 units pRBC.  Planning for EGD tomorrow morning."         Pre-op Assessment    I have reviewed the Patient Summary Reports.     I have reviewed the Nursing Notes. I have reviewed the NPO Status.   I have reviewed the Medications.     Review of Systems  Anesthesia Hx:             Denies Family Hx of Anesthesia complications.    Denies Personal Hx of Anesthesia complications.                    Hematology/Oncology:       -- Anemia:                                "   Cardiovascular:  Cardiovascular Normal Exercise tolerance: good                                           Pulmonary:  Pulmonary Normal                       Hepatic/GI:        Acute GI bleed.          Neurological:           Syncopal episode with acute bleed and anemia.                                Physical Exam  General: Well nourished, Cooperative, Alert and Oriented    Airway:  Mallampati: II   Mouth Opening: Normal  TM Distance: Normal  Tongue: Normal  Neck ROM: Normal ROM    Dental:  Intact    Chest/Lungs:  Clear to auscultation, Normal Respiratory Rate    Heart:  Rate: Normal  Rhythm: Regular Rhythm        Anesthesia Plan  Type of Anesthesia, risks & benefits discussed:    Anesthesia Type: Gen Natural Airway  Intra-op Monitoring Plan: Standard ASA Monitors  Post Op Pain Control Plan: multimodal analgesia  Induction:  IV  Informed Consent: Informed consent signed with the Patient and all parties understand the risks and agree with anesthesia plan.  All questions answered. Patient consented to blood products? Yes  ASA Score: 2 Emergent  Day of Surgery Review of History & Physical: H&P Update referred to the surgeon/provider.    Ready For Surgery From Anesthesia Perspective.     .

## 2024-09-02 NOTE — ED NOTES
Pt accepted to [facility] bed [phone]. Report given to NANCY Peter. RAISSA called for transfer.

## 2024-09-02 NOTE — TRANSFER OF CARE
"Anesthesia Transfer of Care Note    Patient: Tony Zarate    Procedure(s) Performed: Procedure(s) (LRB):  EGD (ESOPHAGOGASTRODUODENOSCOPY) (N/A)  EGD,WITH HEMORRHAGE CONTROL    Patient location: ICU    Anesthesia Type: general    Transport from OR: Upon arrival to PACU/ICU, patient attached to 100% O2 by T-piece with adequate spontaneous ventilation. Continuous SpO2 monitoring in transport    Post pain: adequate analgesia    Post assessment: no apparent anesthetic complications    Post vital signs: stable    Level of consciousness: sedated    Nausea/Vomiting: no nausea/vomiting    Complications: none    Transfer of care protocol was followed    Last vitals: Visit Vitals  BP (!) 95/56   Pulse 74   Temp 36.9 °C (98.5 °F) (Oral)   Resp 19   Ht 5' 7" (1.702 m)   Wt 74.8 kg (165 lb)   SpO2 100%   BMI 25.84 kg/m²     "

## 2024-09-02 NOTE — CONSULTS
Tony Zarate   MRN: [id number]   ADMISSION DATE: 2024  : 1970  AGE: 54 y.o.    DATE :  2024     PROVIDER: ROSALINDA SHELLEY    REASON FOR REFERRAL     SUBJECTIVE:  Tony Zarate is a 54 y.o. male who presents to the ED for rectal bleeding.  Patient reports it started approximately 4:00 a.m. in the morning.  He states multiple episodes of bloody stool.  He also reports passing out 3 times a day.  He denies any fevers or abdominal pain.  He was not on blood thinners.  Patient was never experienced similar episode in the past.      Patient has been taking some pain medication for ureteric stone.  Initially patient said that he was taking Advil.  Later he said it was probably Tylenol.  There seemed to be some confusion about the pain meds.  He described dark stools and significant amount of diarrhea since this morning.  Also had syncopal episode as above.  The patient was initially seen at outside Hospital and was transferred to the floor at [facility] from emergency room.  I was informed by this patient by Dr. Nash about 30 minutes ago when he met me on the floor.  Type and screen has been ordered and is in the process starting packed RBC transfusion.    Review of Systems   Constitutional:  Negative for chills and fever.   HENT:  Negative for congestion and nosebleeds.    Eyes:  Negative for redness.   Respiratory:  Negative for cough, chest tightness and shortness of breath.    Cardiovascular:  Negative for chest pain, palpitations and leg swelling.   Gastrointestinal:  Positive for blood in stool (Patient reported to have dark stool with multiple bouts of diarrhea as of this morning.), diarrhea and vomiting (Patient reported to have 20-30 cc of vomitus of blood as well on the floor). Negative for abdominal distention, abdominal pain, constipation and nausea.   Endocrine: Negative for cold intolerance and polydipsia.   Genitourinary:  Negative for dysuria and flank pain.  "  Musculoskeletal:  Negative for arthralgias.   Skin:  Negative for pallor.   Allergic/Immunologic: Negative for food allergies.   Neurological:  Negative for dizziness and headaches.   Hematological:  Does not bruise/bleed easily.   Psychiatric/Behavioral:  Negative for agitation and confusion.         Review of patient's allergies indicates:  No Known Allergies         There are no discontinued medications.      [START ON 9/2/2024] pantoprazole (PROTONIX) IV infusion  8 mg/hr Intravenous Continuous            No past medical history on file.   Social History     Socioeconomic History    Marital status:     No past surgical history on file.     No family history on file.       OBJECTIVE:     Vitals:    09/01/24 2215 09/01/24 2222 09/01/24 2231 09/01/24 2250   BP: (!) 85/57 115/79 117/78    Pulse: 107 102 90    Resp:   18    Temp:   97.6 °F (36.4 °C) 97.7 °F (36.5 °C)   SpO2: 100%  100%    Weight:  74.8 kg (165 lb)     Height:  5' 7" (1.702 m)           Physical Exam  HENT:      Head: Normocephalic and atraumatic.      Nose: Nose normal.      Mouth/Throat:      Pharynx: Oropharynx is clear.   Eyes:      Extraocular Movements: Extraocular movements intact.      Conjunctiva/sclera: Conjunctivae normal.      Pupils: Pupils are equal, round, and reactive to light.   Cardiovascular:      Rate and Rhythm: Normal rate and regular rhythm.   Pulmonary:      Effort: Pulmonary effort is normal.      Breath sounds: Normal breath sounds.   Abdominal:      General: Bowel sounds are normal.      Palpations: Abdomen is soft.   Musculoskeletal:         General: Normal range of motion.      Cervical back: Neck supple.   Skin:     General: Skin is warm and dry.   Neurological:      Mental Status: He is alert and oriented to person, place, and time.   Psychiatric:         Mood and Affect: Mood normal.          LABS    Recent Labs   Lab 09/01/24  1741 09/01/24  2239   WBC 8.14  --    HGB 9.6* 7.2*   HCT 29.2* 21.2*     --  " "     Recent Labs   Lab 09/01/24  1741      K 3.7   *   CO2 22   BUN 35.9*   CREATININE 0.86   CALCIUM 8.9   BILITOT 0.4   ALKPHOS 48   ALT 15   AST 14   GLUCOSE 132*      Recent Labs   Lab 09/01/24  1741   INR 1.1    No results for input(s): "AMYLASE" in the last 168 hours.   Recent Labs   Lab 09/01/24  1741   INR 1.1           RESULTS: CT Abdomen Pelvis With IV Contrast NO Oral Contrast    Result Date: 9/1/2024  EXAMINATION: CT of the abdomen pelvis with contrast CLINICAL HISTORY: Rectal bleeding COMPARISON: 08/08/2024 TECHNIQUE: Routine CT of the abdomen pelvis performed with intravenous contrast Total DLP: 447.45  mGy.cm Automatic exposure control was utilized to reduce the patient's dose FINDINGS: The visualized lung bases are clear. The liver, spleen, pancreas, gallbladder, adrenal glands, and right kidney are unremarkable.  There is a simple cyst in the inferior pole of the left kidney that measures 0.9 cm.  No hydronephrosis.  No perinephric collection.  The abdominal aorta normal caliber.  No abdominopelvic adenopathy.  There is right inguinal hernia containing portion of small bowel loops without evidence for high-grade obstruction or bowel inflammatory changes.  No free air, free fluid, fluid collection.  Bladder contours are normal. No osseous destructive process.     No acute intra-abdominal intrapelvic abnormality. Bowel containing right inguinal hernia without evidence for obstruction or inflammatory changes. Electronically signed by: Dominic Fields MD Date:    09/01/2024 Time:    19:14    CT Renal Stone Study ABD Pelvis WO    Result Date: 8/8/2024  EXAMINATION: CT RENAL STONE STUDY ABD PELVIS WO CLINICAL HISTORY: Flank pain, kidney stone suspected; TECHNIQUE: CT imaging was performed of the abdomen and pelvis without intravenous contrast. Dose length product is 484 mGycm. Automatic exposure control, adjustment of mA/kV or iterative reconstruction technique was used to limit radiation dose. " COMPARISON: None FINDINGS: Assessment of the visceral organs and vasculature is limited by the lack of IV contrast. Liver/biliary: No concerning hepatic findings. No radiodense gallstone or biliary dilatation appreciated. Pancreas: Normal. Spleen: Normal. Adrenals: Normal. Kidneys, ureters and bladder: There is a 3 mm calculus in the distal left ureter.  There is mild left hydronephrosis with perinephric stranding.  The bladder is within normal limits. Reproductive organs: No pelvic masses. Stomach/bowel: No evidence of bowel obstruction. Normal appendix. No discernible bowel inflammation. Lymph nodes: No pathologically enlarged lymph node identified with noncontrast technique. Peritoneum: No ascites or free air. Vessels: Mild scattered vascular calcifications. Abdominal wall: Right inguinal hernia containing a loop of the terminal ileum. Lung bases: No consolidation or pleural effusion. Bones: No acute osseous findings.     1. 3 mm calculus in the distal left ureter with mild left hydronephrosis and perinephric inflammatory changes. 2. Bowel containing right inguinal hernia without obstruction. Electronically signed by: Zakiya Batres Date:    08/08/2024 Time:    07:10             ICD-10-CM ICD-9-CM   1. GIB (gastrointestinal bleeding)  K92.2 578.9            ASSESSMENT & PLAN:   Tony Zarate is a 54 y.o. male who presents to the ED for rectal bleeding.  Patient reports it started approximately 4:00 a.m. in the morning.  He states multiple episodes of bloody stool.  He also reports passing out 3 times a day.  He denies any fevers or abdominal pain.  He was not on blood thinners.  Patient was never experienced similar episode in the past.      Patient has been taking some pain medication for ureteric stone.  Initially patient said that he was taking Advil.  Later he said it was probably Tylenol.  There seemed to be some confusion about the pain meds.  He described dark stools and significant amount of diarrhea  since this morning.  Also had syncopal episode as above.  The patient was initially seen at outside Hospital and was transferred to the floor at [facility] from emergency room.  I was informed by this patient by Dr. Nash about 30 minutes ago when he met me on the floor.  Type and screen has been ordered and is in the process starting packed RBC transfusion.    Recommendations:   1. IV PPI infusion.    2. Avoid NSAIDs or anticoagulation.    3. Patient is in the process of receiving 2 units of packed RBC transfusion.  In fact I will go ahead and recommend an additional unit and make total of 3 units of packed RBC.  4. He is in the process of getting transferred to ICU.  Hemoglobin was around 16 a month ago.  He has had a significant drop of hemoglobin along with syncopal episode.  This seems to be consistent with a acute upper GI bleed.  He would need upper endoscopy in the next 12-24 hours after volume resuscitation with packed RBC as well as fluid.  4. I have discussed above plan with the patient and his family at the bedside in details.  They are in agreement.  Upper endoscopy will be performed tomorrow morning.  This can be done earlier if necessary.    I have discussed above in detail with Dr. Nash.  Thank you for consultation

## 2024-09-02 NOTE — H&P
[facility]  Pulmonary Critical Care Note    Patient Name: Tony Zarate  MRN: [id number]  Admission Date: 9/1/2024  Hospital Length of Stay: 1 days  Code Status: Full Code  Attending Provider: Allan Nash MD  Primary Care Provider: Mirlande Zarate NP     Subjective:     HPI:   Patient is a 54-year-old male with no significant past medical history who presented to an outside ER with a complaint of bloody bowel movements multi multiple times over the last 1 day with an associated syncopal episode.  At the prior facility, workup showed a drop in his hemoglobin from 16 to 9 and patient was transferred to [facility] for GI services.  CT abdomen displayed no acute pathology.  On arrival to our facility, patient had an episode of hematemesis (approximately 15 cc according to nursing staff) and another syncopal episode.  Patient states that he has been noticing multiple episodes of black/tarry stools over the last day.  He denies fever, chills, other prior episodes of hematemesis, abdominal pain, history of GERD, hematochezia.  Patient denies risk factors such as NSAID use, alcohol use, and tobacco abuse.  Patient denies history of previous GI bleed or GI pathology in the past.  Patient states that he has never underwent EGD or colonoscopy in the past.      Patient was evaluated by GI who recommended ICU admission.  Patient was started on IV PPI infusion and transfused 3 units pRBC.  Planning for EGD tomorrow morning.    No past medical history on file.    No past surgical history on file.    Social History     Socioeconomic History    Marital status:      Current Outpatient Medications   Medication Instructions    ondansetron (ZOFRAN) 4 mg, Oral, Every 6 hours    tamsulosin (FLOMAX) 0.4 mg, Oral, Daily       Current Inpatient Medications      Current Intravenous Infusions   pantoprazole (PROTONIX) IV infusion  8 mg/hr Intravenous Continuous             Review of Systems    Constitutional:  Negative for chills, fever and malaise/fatigue.   Respiratory:  Negative for cough, hemoptysis, sputum production and shortness of breath.    Cardiovascular:  Negative for chest pain, palpitations and leg swelling.   Gastrointestinal:  Positive for melena and vomiting. Negative for abdominal pain, blood in stool, constipation, diarrhea and nausea.   Genitourinary:  Negative for dysuria, frequency, hematuria and urgency.   Musculoskeletal:  Positive for falls.   Neurological:  Positive for dizziness, loss of consciousness and weakness. Negative for sensory change and headaches.          Objective:     No intake or output data in the 24 hours ending 09/02/24 0043      Vital Signs (Most Recent):  Temp: 98.1 °F (36.7 °C) (09/02/24 0004)  Pulse: (!) 111 (09/02/24 0004)  Resp: 17 (09/02/24 0004)  BP: 119/66 (09/02/24 0004)  SpO2: 100 % (09/02/24 0004)  Body mass index is 25.84 kg/m².  Weight: 74.8 kg (165 lb) Vital Signs (24h Range):  Temp:  [97.6 °F (36.4 °C)-98.5 °F (36.9 °C)] 98.1 °F (36.7 °C)  Pulse:  [] 111  Resp:  [14-20] 17  SpO2:  [98 %-100 %] 100 %  BP: ()/(57-80) 119/66     Physical Exam  Vitals reviewed.   Constitutional:       General: He is not in acute distress.  Eyes:      Comments: Conjunctival pallor   Cardiovascular:      Rate and Rhythm: Regular rhythm. Tachycardia present.      Pulses: Normal pulses.      Heart sounds: No murmur heard.     No friction rub. No gallop.   Pulmonary:      Breath sounds: No wheezing, rhonchi or rales.   Abdominal:      General: There is no distension.      Palpations: Abdomen is soft.      Tenderness: There is no abdominal tenderness. There is no guarding or rebound.   Musculoskeletal:      Right lower leg: No edema.      Left lower leg: No edema.   Skin:     Capillary Refill: Capillary refill takes 2 to 3 seconds.   Neurological:      General: No focal deficit present.      Mental Status: He is alert and oriented to person, place, and time.  "  Psychiatric:         Mood and Affect: Mood normal.         Behavior: Behavior normal.         Lines/Drains/Airways       Peripheral Intravenous Line  Duration                  Peripheral IV - Single Lumen 09/01/24 0900 20 G Right Antecubital <1 day                    Significant Labs:    Lab Results   Component Value Date    WBC 8.14 09/01/2024    HGB 7.2 (L) 09/01/2024    HCT 21.2 (L) 09/01/2024    MCV 88.5 09/01/2024     09/01/2024           BMP  Lab Results   Component Value Date     09/01/2024    K 3.7 09/01/2024    CO2 22 09/01/2024    BUN 35.9 (H) 09/01/2024    CREATININE 0.86 09/01/2024    CALCIUM 8.9 09/01/2024    AGAP 9.0 09/01/2024    EGFRNONAA >60 10/03/2017       ABG  No results for input(s): "PH", "PO2", "PCO2", "HCO3", "POCBASEDEF" in the last 168 hours.    Mechanical Ventilation Support:     Significant Imaging:  I have reviewed the pertinent imaging within the past 24 hours.    Assessment/Plan:     Assessment  Acute upper GI bleed  Acute blood loss anemia secondary to above  Syncope    Plan  Admitted to ICU given active upper GI bleed with decreasing H/H  GI following; plan for EGD this morning  Continue Protonix drip  Patient currently receiving 3 units pRBC.  Recheck CBC after transfusion.  Start maintenance fluids after transfusion  Continue to hemodynamically monitor patient and assess need for further transfusion  Withhold NSAIDS and anticoagulation at this time    DVT: SCD  GI Prophylaxis:  Protonix     32 minutes of critical care was time spent personally by me on the following activities: development of treatment plan with patient or surrogate and bedside caregivers, discussions with consultants, evaluation of patient's response to treatment, examination of patient, ordering and performing treatments and interventions, ordering and review of laboratory studies, ordering and review of radiographic studies, pulse oximetry, re-evaluation of patient's condition.  This critical care " time did not overlap with that of any other provider or involve time for any procedures.     Art Wallace DO  Pulmonary Critical Care Medicine  [facility]  DOS: 09/02/2024

## 2024-09-02 NOTE — H&P
[facility] Hospital Medicine History & Physical Examination       Patient Name: Tony Zarate  MRN: [id number]  Patient Class: IP- Inpatient   Admission Date: 9/1/2024 10:00 PM  Length of Stay: 0  Admitting Service: Hospital Medicine   Attending Physician: Allan Nash MD   Primary Care Provider: Mirlande Zarate NP  History source: EMR, patient and/or patient's family    CHIEF COMPLAINT   Syncope    HISTORY OF PRESENT ILLNESS:   Patient is a 54-year-old male with no chronic medical conditions presented to an outside ER with bloody bowel movements multiple times a day over the preceding 12 hours and a syncopal episode.  At the prior facility workup showed a drop in his hemoglobin from 16 to 9 and a request was made to transfer to this facility for further care.  CT scan of the abdomen showed no acute process.  Patient had an additional syncopal episode on arrival and an episode of hematemesis.  Patient reports no excessive NSAID use, no alcohol use.  Denies a history of GI bleeding in the past.    PAST MEDICAL HISTORY:   No chronic medical conditions    PAST SURGICAL HISTORY:   No prior surgery    ALLERGIES:   Patient has no known allergies.    FAMILY HISTORY:   Reviewed and non-contributory     SOCIAL HISTORY:   Screening for Social Drivers for health: Patient screened for food insecurity, housing instability, transportation needs, utility   difficulties, and interpersonal safety (select all that apply as identified as concern)  []Housing or Food  []Transportation Needs  []Utility Difficulties  []Interpersonal safety  [x]None  Denies tobacco drug or alcohol use    HOME MEDICATIONS:     Prior to Admission medications    Medication Sig   ondansetron (ZOFRAN) 4 MG tablet Take 1 tablet (4 mg total) by mouth every 6 (six) hours.   tamsulosin (FLOMAX) 0.4 mg Cap Take 1 capsule (0.4 mg total) by mouth once daily.       REVIEW OF SYSTEMS:   Except as documented, all other systems  "reviewed and negative     PHYSICAL EXAM:   T 97.7 °F (36.5 °C)   /78   P 90   RR 18   O2 100 %  GENERAL: awake, alert, oriented pale appearing  HEENT: normocephalic atraumatic   NECK: supple   LUNGS: Clear bilaterally, no wheezing or rales, no accessory muscle use   CVS: Regular rate and rhythm, normal peripheral perfusion  ABD: Soft, non-tender, non-distended, bowel sounds present  EXTREMITIES: no clubbing or cyanosis  SKIN: Warm, dry.   NEURO: alert and oriented, grossly without focal deficits   PSYCHIATRIC: Cooperative    LABS AND IMAGING:     Recent Labs     09/01/24  1741 09/01/24  2239   WBC 8.14  --    RBC 3.30*  --    HGB 9.6* 7.2*   HCT 29.2* 21.2*   MCV 88.5  --    MCH 29.1  --    MCHC 32.9*  --    RDW 12.4  --      --      No results for input(s): "LACTIC" in the last 72 hours.  Recent Labs     09/01/24  1741   INR 1.1     No results for input(s): "HGBA1C", "CHOL", "TRIG", "LDL", "VLDL", "HDL" in the last 72 hours.   Recent Labs     09/01/24  1741      K 3.7   CO2 22   BUN 35.9*   CREATININE 0.86   GLUCOSE 132*   CALCIUM 8.9   ALBUMIN 3.6   GLOBULIN 2.1*   ALKPHOS 48   ALT 15   AST 14   BILITOT 0.4     No results for input(s): "BNP", "CPK", "TROPONINI" in the last 72 hours.       CT Abdomen Pelvis With IV Contrast NO Oral Contrast  Narrative: EXAMINATION:  CT of the abdomen pelvis with contrast    CLINICAL HISTORY:  Rectal bleeding    COMPARISON:  08/08/2024    TECHNIQUE:  Routine CT of the abdomen pelvis performed with intravenous contrast    Total DLP: 447.45  mGy.cm    Automatic exposure control was utilized to reduce the patient's dose    FINDINGS:  The visualized lung bases are clear.    The liver, spleen, pancreas, gallbladder, adrenal glands, and right kidney are unremarkable.  There is a simple cyst in the inferior pole of the left kidney that measures 0.9 cm.  No hydronephrosis.  No perinephric collection.  The abdominal aorta normal caliber.  No abdominopelvic adenopathy.  " There is right inguinal hernia containing portion of small bowel loops without evidence for high-grade obstruction or bowel inflammatory changes.  No free air, free fluid, fluid collection.  Bladder contours are normal.    No osseous destructive process.  Impression: No acute intra-abdominal intrapelvic abnormality.    Bowel containing right inguinal hernia without evidence for obstruction or inflammatory changes.    Electronically signed by: Dominic Fields MD  Date:    09/01/2024  Time:    19:14      ASSESSMENT & PLAN:   Acute GI bleeding likely upper (hematemesis)  Acute blood loss anemia secondary to above   Syncopal episode secondary to above    -transferred to ICU for closer monitoring giving ongoing blood loss  -Protonix drip, transfuse 2 units of blood  -continue close hemodynamic monitoring   -GI planning upper endoscopy in a.m.    DVT prophylaxis: scd  Code status: full    If patient was admitted under observational status it is with my approval/permission.     At least 55 min was spent on this history and physical.  Time seen: 10:50PM   Critical care time = 35 min; Critical care diagnosis = acute GIB   Allan Nash MD

## 2024-09-02 NOTE — NURSING
Nurses Note -- 4 Eyes      9/2/2024   12:53 AM      Skin assessed during: Admit      [x] No Altered Skin Integrity Present    [x]Prevention Measures Documented      [] Yes- Altered Skin Integrity Present or Discovered   [] LDA Added if Not in Epic (Describe Wound)   [] New Altered Skin Integrity was Present on Admit and Documented in LDA   [] Wound Image Taken    Wound Care Consulted? No    Attending Nurse:  Rome Ying RN    Second RN/Staff Member:  Sunitha Niño RN

## 2024-09-02 NOTE — ANESTHESIA PROCEDURE NOTES
Intubation    Date/Time: 9/2/2024 12:20 PM    Performed by: Srini Mcdonald CRNA  Authorized by: Eddie Macdonald MD    Intubation:     Induction:  Intravenous    Intubated:  Postinduction    Mask Ventilation:  Easy mask    Attempts:  1    Attempted By:  Staff anesthesiologist    Method of Intubation:  Direct    Blade:  Glidescope 3    Laryngeal View Grade: Grade I - full view of cords      Difficult Airway Encountered?: No      Complications:  None    Airway Device:  Oral endotracheal tube    Airway Device Size:  7.5    Style/Cuff Inflation:  Cuffed (inflated to minimal occlusive pressure)    Inflation Amount (mL):  10    Tube secured:  23    Secured at:  The lips    Placement Verified By:  Capnometry    Complicating Factors:  None    Findings Post-Intubation:  BS equal bilateral and atraumatic/condition of teeth unchanged

## 2024-09-02 NOTE — NURSING
Nurses Note -- 4 Eyes      9/2/2024   10:54 AM      Skin assessed during: Daily Assessment      [x] No Altered Skin Integrity Present    [x]Prevention Measures Documented      [] Yes- Altered Skin Integrity Present or Discovered   [] LDA Added if Not in Epic (Describe Wound)   [] New Altered Skin Integrity was Present on Admit and Documented in LDA   [] Wound Image Taken    Wound Care Consulted? No    Attending Nurse:  Ava Chavarria RN/Staff Member:   NANCY Iyer

## 2024-09-02 NOTE — PROCEDURES
Tony Zarate   MRN: [id number]   ADMISSION DATE: 2024  : 1970  AGE: 54 y.o.    DATE OF PROCEDURE:  2024     PROCEDURE:  EGD with sclerotherapy/cautery     SURGEON: ROSALINDA SHELLEY    PREOPERATIVE DIAGNOSIS:  Acute blood loss anemia, melena, hematemesis, history of recent NSAID use (Toradol)    POSTOPERATIVE DIAGNOSIS:  1.  Duodenal ulcer, apex of the duodenal bulb with visible vessel with evidence of active oozing (Herbie 1b).  Sclerotherapy/cautery was done with good hemostasis.    2. Small hiatal hernia.  Otherwise normal exam.    HISTORY AND PHYSICAL:  As per preoperative note.      DESCRIPTION OF PROCEDURE:  Informed consent was obtained.  Patient was placed in left lateral position.  Sedation per anesthesia service.  Olympus video gastroscope was introduced into the oral cavity and esophagus was intubated under direct visualization. The scope was carefully advanced to the distal duodenum.  Patient tolerated the procedure well without any complications.    FINDINGS:  Esophagus: Normal mucosa.  GE junction at approximately 43 cm.  There was a small hiatal hernia.  Stomach:  There were fresh blood clots noted in the fundus, body and antrum on initial introduction of the scope.  These were adequately suctioned.  No evidence of ulceration noted after irrigation in the stomach.  Duodenum:  There were significant amount of blood clots noted in the duodenal bulb extending to the 2nd portion of the duodenum.  View was extremely limited.  There was an ulcer noted at the apex of the duodenal bulb with a visible vessel and active oozing.  Sclerotherapy/cautery was done with significant improvement of hemostasis.  It was very difficult to wash the blood clots with the regular scope.  Regular gastroscope was replaced with a therapeutic scope.  I was able to suction the large blood clots with the therapeutic scope with significantly improved visualization.  At this time, patient was intubated per anesthesia  service for airway protection in consultation with the ICU team.  Therapeutic gastroscope was reintroduced.  Additional cautery was done close to the margin of the ulcer at a site of pigmented spot.  No active bleeding was noted after therapeutic was completed.  No blood clots were noted in the duodenal bulb or 2nd portion of the duodenal at the end of procedure.  Photodocumentation was obtained.  Adequate suctioning was done before the scope was withdrawn.    ESTIMATED BLOOD LOSS:  30 cc or more.    COMPLICATIONS:  None.    RECOMMENDATIONS:  1.  Recommend mechanical intubation for next 24 hours.  2. Serial hemoglobin/hematocrit.  Transfuse blood products as necessary.    3. Avoid NSAIDs or anticoagulation.    4. Continue IV PPI for a total of 72 hours and then switch to IV or oral formulation pending course in hospital.    5. Check stool for H pylori antigen.  Treat if positive once patient is extubated.

## 2024-09-02 NOTE — PLAN OF CARE
Problem: Adult Inpatient Plan of Care  Goal: Plan of Care Review  Outcome: Progressing  Flowsheets (Taken 9/1/2024 2250)  Plan of Care Reviewed With:   patient   spouse  Goal: Patient-Specific Goal (Individualized)  Outcome: Progressing  Goal: Absence of Hospital-Acquired Illness or Injury  Outcome: Progressing  Intervention: Identify and Manage Fall Risk  Flowsheets (Taken 9/1/2024 2250)  Safety Promotion/Fall Prevention:   assistive device/personal item within reach   Fall Risk reviewed with patient/family   Fall Risk signage in place   family expresses understanding of fall risk and prevention   lighting adjusted   medications reviewed   nonskid shoes/socks when out of bed   side rails raised x 3  Intervention: Prevent Skin Injury  Flowsheets (Taken 9/1/2024 2250)  Body Position:   supine   position changed independently  Skin Protection: protective footwear used  Device Skin Pressure Protection:   absorbent pad utilized/changed   tubing/devices free from skin contact  Intervention: Prevent and Manage VTE (Venous Thromboembolism) Risk  Flowsheets (Taken 9/1/2024 2250)  VTE Prevention/Management:   bleeding precations maintained   bleeding risk assessed   bleeding risk factor(s) identified, provider notified   intravenous hydration  Intervention: Prevent Infection  Flowsheets (Taken 9/1/2024 2250)  Infection Prevention:   environmental surveillance performed   equipment surfaces disinfected   hand hygiene promoted   personal protective equipment utilized   rest/sleep promoted   single patient room provided  Goal: Optimal Comfort and Wellbeing  Outcome: Progressing  Intervention: Monitor Pain and Promote Comfort  Flowsheets (Taken 9/1/2024 2250)  Pain Management Interventions:   care clustered   quiet environment facilitated  Intervention: Provide Person-Centered Care  Flowsheets (Taken 9/1/2024 2250)  Trust Relationship/Rapport:   care explained   choices provided   emotional support provided   empathic  listening provided   questions answered   questions encouraged   reassurance provided   thoughts/feelings acknowledged  Goal: Readiness for Transition of Care  Outcome: Progressing

## 2024-09-03 ENCOUNTER — ANESTHESIA EVENT (OUTPATIENT)
Dept: ENDOSCOPY | Facility: HOSPITAL | Age: 54
End: 2024-09-03
Payer: COMMERCIAL

## 2024-09-03 ENCOUNTER — ANESTHESIA (OUTPATIENT)
Dept: INTERVENTIONAL RADIOLOGY/VASCULAR | Facility: HOSPITAL | Age: 54
End: 2024-09-03
Payer: COMMERCIAL

## 2024-09-03 ENCOUNTER — ANESTHESIA (OUTPATIENT)
Dept: ENDOSCOPY | Facility: HOSPITAL | Age: 54
End: 2024-09-03
Payer: COMMERCIAL

## 2024-09-03 LAB
ABO + RH BLD: NORMAL
ALBUMIN SERPL-MCNC: 2.2 G/DL (ref 3.5–5)
ALBUMIN/GLOB SERPL: 1.6 RATIO (ref 1.1–2)
ALP SERPL-CCNC: 34 UNIT/L (ref 40–150)
ALT SERPL-CCNC: 8 UNIT/L (ref 0–55)
ANION GAP SERPL CALC-SCNC: 6 MEQ/L
APTT PPP: 29.2 SECONDS (ref 23.2–33.7)
AST SERPL-CCNC: 12 UNIT/L (ref 5–34)
BASOPHILS # BLD AUTO: 0.02 X10(3)/MCL
BASOPHILS # BLD AUTO: 0.02 X10(3)/MCL
BASOPHILS # BLD AUTO: 0.03 X10(3)/MCL
BASOPHILS # BLD AUTO: 0.04 X10(3)/MCL
BASOPHILS # BLD AUTO: 0.05 X10(3)/MCL
BASOPHILS NFR BLD AUTO: 0.2 %
BASOPHILS NFR BLD AUTO: 0.3 %
BASOPHILS NFR BLD AUTO: 0.4 %
BILIRUB SERPL-MCNC: 0.3 MG/DL
BLD PROD TYP BPU: NORMAL
BLOOD UNIT EXPIRATION DATE: NORMAL
BLOOD UNIT TYPE CODE: 2800
BLOOD UNIT TYPE CODE: 5100
BLOOD UNIT TYPE CODE: 7300
BLOOD UNIT TYPE CODE: 9500
BUN SERPL-MCNC: 28.5 MG/DL (ref 8.4–25.7)
CALCIUM SERPL-MCNC: 7.1 MG/DL (ref 8.4–10.2)
CHLORIDE SERPL-SCNC: 112 MMOL/L (ref 98–107)
CO2 SERPL-SCNC: 22 MMOL/L (ref 22–29)
CREAT SERPL-MCNC: 0.76 MG/DL (ref 0.73–1.18)
CREAT/UREA NIT SERPL: 38
CROSSMATCH INTERPRETATION: NORMAL
DISPENSE STATUS: NORMAL
EOSINOPHIL # BLD AUTO: 0.01 X10(3)/MCL (ref 0–0.9)
EOSINOPHIL # BLD AUTO: 0.02 X10(3)/MCL (ref 0–0.9)
EOSINOPHIL # BLD AUTO: 0.05 X10(3)/MCL (ref 0–0.9)
EOSINOPHIL # BLD AUTO: 0.08 X10(3)/MCL (ref 0–0.9)
EOSINOPHIL # BLD AUTO: 0.13 X10(3)/MCL (ref 0–0.9)
EOSINOPHIL NFR BLD AUTO: 0.2 %
EOSINOPHIL NFR BLD AUTO: 0.2 %
EOSINOPHIL NFR BLD AUTO: 0.4 %
EOSINOPHIL NFR BLD AUTO: 0.5 %
EOSINOPHIL NFR BLD AUTO: 2.2 %
ERYTHROCYTE [DISTWIDTH] IN BLOOD BY AUTOMATED COUNT: 14.3 % (ref 11.5–17)
ERYTHROCYTE [DISTWIDTH] IN BLOOD BY AUTOMATED COUNT: 14.4 % (ref 11.5–17)
ERYTHROCYTE [DISTWIDTH] IN BLOOD BY AUTOMATED COUNT: 15.2 % (ref 11.5–17)
ERYTHROCYTE [DISTWIDTH] IN BLOOD BY AUTOMATED COUNT: 17.5 % (ref 11.5–17)
ERYTHROCYTE [DISTWIDTH] IN BLOOD BY AUTOMATED COUNT: 17.8 % (ref 11.5–17)
GFR SERPLBLD CREATININE-BSD FMLA CKD-EPI: >60 ML/MIN/1.73/M2
GLOBULIN SER-MCNC: 1.4 GM/DL (ref 2.4–3.5)
GLUCOSE SERPL-MCNC: 129 MG/DL (ref 74–100)
H. PYLORI STOOL: NEGATIVE
HCT VFR BLD AUTO: 18.6 % (ref 42–52)
HCT VFR BLD AUTO: 24.1 % (ref 42–52)
HCT VFR BLD AUTO: 24.6 % (ref 42–52)
HCT VFR BLD AUTO: 25.8 % (ref 42–52)
HCT VFR BLD AUTO: 27 % (ref 42–52)
HGB BLD-MCNC: 6.2 G/DL (ref 14–18)
HGB BLD-MCNC: 8.3 G/DL (ref 14–18)
HGB BLD-MCNC: 8.4 G/DL (ref 14–18)
HGB BLD-MCNC: 9 G/DL (ref 14–18)
HGB BLD-MCNC: 9.2 G/DL (ref 14–18)
IMM GRANULOCYTES # BLD AUTO: 0.04 X10(3)/MCL (ref 0–0.04)
IMM GRANULOCYTES # BLD AUTO: 0.05 X10(3)/MCL (ref 0–0.04)
IMM GRANULOCYTES # BLD AUTO: 0.06 X10(3)/MCL (ref 0–0.04)
IMM GRANULOCYTES # BLD AUTO: 0.07 X10(3)/MCL (ref 0–0.04)
IMM GRANULOCYTES # BLD AUTO: 0.09 X10(3)/MCL (ref 0–0.04)
IMM GRANULOCYTES NFR BLD AUTO: 0.4 %
IMM GRANULOCYTES NFR BLD AUTO: 0.5 %
IMM GRANULOCYTES NFR BLD AUTO: 0.5 %
IMM GRANULOCYTES NFR BLD AUTO: 0.7 %
IMM GRANULOCYTES NFR BLD AUTO: 1.4 %
INR PPP: 1.2
LYMPHOCYTES # BLD AUTO: 0.81 X10(3)/MCL (ref 0.6–4.6)
LYMPHOCYTES # BLD AUTO: 1.04 X10(3)/MCL (ref 0.6–4.6)
LYMPHOCYTES # BLD AUTO: 1.85 X10(3)/MCL (ref 0.6–4.6)
LYMPHOCYTES # BLD AUTO: 2.27 X10(3)/MCL (ref 0.6–4.6)
LYMPHOCYTES # BLD AUTO: 2.79 X10(3)/MCL (ref 0.6–4.6)
LYMPHOCYTES NFR BLD AUTO: 13.7 %
LYMPHOCYTES NFR BLD AUTO: 14.7 %
LYMPHOCYTES NFR BLD AUTO: 15.8 %
LYMPHOCYTES NFR BLD AUTO: 17 %
LYMPHOCYTES NFR BLD AUTO: 18.8 %
MCH RBC QN AUTO: 28.1 PG (ref 27–31)
MCH RBC QN AUTO: 28.2 PG (ref 27–31)
MCH RBC QN AUTO: 30.5 PG (ref 27–31)
MCH RBC QN AUTO: 30.5 PG (ref 27–31)
MCH RBC QN AUTO: 31 PG (ref 27–31)
MCHC RBC AUTO-ENTMCNC: 33.3 G/DL (ref 33–36)
MCHC RBC AUTO-ENTMCNC: 33.7 G/DL (ref 33–36)
MCHC RBC AUTO-ENTMCNC: 34.1 G/DL (ref 33–36)
MCHC RBC AUTO-ENTMCNC: 34.9 G/DL (ref 33–36)
MCHC RBC AUTO-ENTMCNC: 34.9 G/DL (ref 33–36)
MCV RBC AUTO: 80.6 FL (ref 80–94)
MCV RBC AUTO: 82.8 FL (ref 80–94)
MCV RBC AUTO: 88.9 FL (ref 80–94)
MCV RBC AUTO: 90.4 FL (ref 80–94)
MCV RBC AUTO: 91.6 FL (ref 80–94)
MONOCYTES # BLD AUTO: 0.54 X10(3)/MCL (ref 0.1–1.3)
MONOCYTES # BLD AUTO: 0.54 X10(3)/MCL (ref 0.1–1.3)
MONOCYTES # BLD AUTO: 0.98 X10(3)/MCL (ref 0.1–1.3)
MONOCYTES # BLD AUTO: 1.35 X10(3)/MCL (ref 0.1–1.3)
MONOCYTES # BLD AUTO: 1.51 X10(3)/MCL (ref 0.1–1.3)
MONOCYTES NFR BLD AUTO: 10.1 %
MONOCYTES NFR BLD AUTO: 10.2 %
MONOCYTES NFR BLD AUTO: 7.8 %
MONOCYTES NFR BLD AUTO: 8.2 %
MONOCYTES NFR BLD AUTO: 9.1 %
NEUTROPHILS # BLD AUTO: 10.37 X10(3)/MCL (ref 2.1–9.2)
NEUTROPHILS # BLD AUTO: 4.39 X10(3)/MCL (ref 2.1–9.2)
NEUTROPHILS # BLD AUTO: 4.87 X10(3)/MCL (ref 2.1–9.2)
NEUTROPHILS # BLD AUTO: 9.59 X10(3)/MCL (ref 2.1–9.2)
NEUTROPHILS # BLD AUTO: 9.63 X10(3)/MCL (ref 2.1–9.2)
NEUTROPHILS NFR BLD AUTO: 69.7 %
NEUTROPHILS NFR BLD AUTO: 71.7 %
NEUTROPHILS NFR BLD AUTO: 74 %
NEUTROPHILS NFR BLD AUTO: 74.1 %
NEUTROPHILS NFR BLD AUTO: 76.6 %
NRBC BLD AUTO-RTO: 0 %
NRBC BLD AUTO-RTO: 0 %
NRBC BLD AUTO-RTO: 0.2 %
NRBC BLD AUTO-RTO: 0.3 %
NRBC BLD AUTO-RTO: 0.3 %
PLATELET # BLD AUTO: 104 X10(3)/MCL (ref 130–400)
PLATELET # BLD AUTO: 82 X10(3)/MCL (ref 130–400)
PLATELET # BLD AUTO: 92 X10(3)/MCL (ref 130–400)
PLATELET # BLD AUTO: 92 X10(3)/MCL (ref 130–400)
PLATELET # BLD AUTO: 93 X10(3)/MCL (ref 130–400)
PLATELETS.RETICULATED NFR BLD AUTO: 2 % (ref 0.9–11.2)
PLATELETS.RETICULATED NFR BLD AUTO: 4 % (ref 0.9–11.2)
PLATELETS.RETICULATED NFR BLD AUTO: 4.4 % (ref 0.9–11.2)
PLATELETS.RETICULATED NFR BLD AUTO: 4.8 % (ref 0.9–11.2)
PMV BLD AUTO: 10.6 FL (ref 7.4–10.4)
PMV BLD AUTO: 10.8 FL (ref 7.4–10.4)
PMV BLD AUTO: 11.2 FL (ref 7.4–10.4)
PMV BLD AUTO: 9.5 FL (ref 7.4–10.4)
PMV BLD AUTO: 9.8 FL (ref 7.4–10.4)
POTASSIUM SERPL-SCNC: 4 MMOL/L (ref 3.5–5.1)
PROT SERPL-MCNC: 3.6 GM/DL (ref 6.4–8.3)
PROTHROMBIN TIME: 15.3 SECONDS (ref 12.5–14.5)
RBC # BLD AUTO: 2.03 X10(6)/MCL (ref 4.7–6.1)
RBC # BLD AUTO: 2.71 X10(6)/MCL (ref 4.7–6.1)
RBC # BLD AUTO: 2.72 X10(6)/MCL (ref 4.7–6.1)
RBC # BLD AUTO: 3.2 X10(6)/MCL (ref 4.7–6.1)
RBC # BLD AUTO: 3.26 X10(6)/MCL (ref 4.7–6.1)
SODIUM SERPL-SCNC: 140 MMOL/L (ref 136–145)
UNIT NUMBER: NORMAL
WBC # BLD AUTO: 12.57 X10(3)/MCL (ref 4.5–11.5)
WBC # BLD AUTO: 13.36 X10(3)/MCL (ref 4.5–11.5)
WBC # BLD AUTO: 14.86 X10(3)/MCL (ref 4.5–11.5)
WBC # BLD AUTO: 5.93 X10(3)/MCL (ref 4.5–11.5)
WBC # BLD AUTO: 6.57 X10(3)/MCL (ref 4.5–11.5)

## 2024-09-03 PROCEDURE — P9016 RBC LEUKOCYTES REDUCED: HCPCS

## 2024-09-03 PROCEDURE — 85730 THROMBOPLASTIN TIME PARTIAL: CPT

## 2024-09-03 PROCEDURE — 85025 COMPLETE CBC W/AUTO DIFF WBC: CPT | Performed by: INTERNAL MEDICINE

## 2024-09-03 PROCEDURE — P9037 PLATE PHERES LEUKOREDU IRRAD: HCPCS

## 2024-09-03 PROCEDURE — 85610 PROTHROMBIN TIME: CPT

## 2024-09-03 PROCEDURE — 63600175 PHARM REV CODE 636 W HCPCS: Performed by: INTERNAL MEDICINE

## 2024-09-03 PROCEDURE — 05HM33Z INSERTION OF INFUSION DEVICE INTO RIGHT INTERNAL JUGULAR VEIN, PERCUTANEOUS APPROACH: ICD-10-PCS

## 2024-09-03 PROCEDURE — P9012 CRYOPRECIPITATE EACH UNIT: HCPCS

## 2024-09-03 PROCEDURE — 51702 INSERT TEMP BLADDER CATH: CPT

## 2024-09-03 PROCEDURE — 04L33DZ OCCLUSION OF HEPATIC ARTERY WITH INTRALUMINAL DEVICE, PERCUTANEOUS APPROACH: ICD-10-PCS | Performed by: RADIOLOGY

## 2024-09-03 PROCEDURE — 99900026 HC AIRWAY MAINTENANCE (STAT)

## 2024-09-03 PROCEDURE — 25000003 PHARM REV CODE 250: Performed by: INTERNAL MEDICINE

## 2024-09-03 PROCEDURE — 85025 COMPLETE CBC W/AUTO DIFF WBC: CPT

## 2024-09-03 PROCEDURE — 86923 COMPATIBILITY TEST ELECTRIC: CPT | Mod: 91

## 2024-09-03 PROCEDURE — 99900031 HC PATIENT EDUCATION (STAT)

## 2024-09-03 PROCEDURE — 25000003 PHARM REV CODE 250: Performed by: NURSE ANESTHETIST, CERTIFIED REGISTERED

## 2024-09-03 PROCEDURE — 20000000 HC ICU ROOM

## 2024-09-03 PROCEDURE — 37000008 HC ANESTHESIA 1ST 15 MINUTES: Performed by: INTERNAL MEDICINE

## 2024-09-03 PROCEDURE — 36415 COLL VENOUS BLD VENIPUNCTURE: CPT | Performed by: INTERNAL MEDICINE

## 2024-09-03 PROCEDURE — 99900035 HC TECH TIME PER 15 MIN (STAT)

## 2024-09-03 PROCEDURE — B414YZZ FLUOROSCOPY OF SUPERIOR MESENTERIC ARTERY USING OTHER CONTRAST: ICD-10-PCS | Performed by: RADIOLOGY

## 2024-09-03 PROCEDURE — 63600175 PHARM REV CODE 636 W HCPCS: Performed by: NURSE ANESTHETIST, CERTIFIED REGISTERED

## 2024-09-03 PROCEDURE — 25000003 PHARM REV CODE 250

## 2024-09-03 PROCEDURE — 37000009 HC ANESTHESIA EA ADD 15 MINS: Performed by: INTERNAL MEDICINE

## 2024-09-03 PROCEDURE — 94761 N-INVAS EAR/PLS OXIMETRY MLT: CPT

## 2024-09-03 PROCEDURE — 94003 VENT MGMT INPAT SUBQ DAY: CPT

## 2024-09-03 PROCEDURE — P9017 PLASMA 1 DONOR FRZ W/IN 8 HR: HCPCS

## 2024-09-03 PROCEDURE — 80053 COMPREHEN METABOLIC PANEL: CPT

## 2024-09-03 PROCEDURE — 43235 EGD DIAGNOSTIC BRUSH WASH: CPT | Performed by: INTERNAL MEDICINE

## 2024-09-03 PROCEDURE — 36415 COLL VENOUS BLD VENIPUNCTURE: CPT

## 2024-09-03 PROCEDURE — 25500020 PHARM REV CODE 255: Performed by: RADIOLOGY

## 2024-09-03 PROCEDURE — P9035 PLATELET PHERES LEUKOREDUCED: HCPCS

## 2024-09-03 PROCEDURE — 0DJ08ZZ INSPECTION OF UPPER INTESTINAL TRACT, VIA NATURAL OR ARTIFICIAL OPENING ENDOSCOPIC: ICD-10-PCS | Performed by: INTERNAL MEDICINE

## 2024-09-03 PROCEDURE — 94760 N-INVAS EAR/PLS OXIMETRY 1: CPT

## 2024-09-03 PROCEDURE — 27100171 HC OXYGEN HIGH FLOW UP TO 24 HOURS

## 2024-09-03 PROCEDURE — 87338 HPYLORI STOOL AG IA: CPT

## 2024-09-03 PROCEDURE — P9016 RBC LEUKOCYTES REDUCED: HCPCS | Performed by: INTERNAL MEDICINE

## 2024-09-03 PROCEDURE — 86923 COMPATIBILITY TEST ELECTRIC: CPT | Mod: 91 | Performed by: INTERNAL MEDICINE

## 2024-09-03 PROCEDURE — 25000003 PHARM REV CODE 250: Mod: JZ,JG

## 2024-09-03 PROCEDURE — 99231 SBSQ HOSP IP/OBS SF/LOW 25: CPT | Mod: ,,, | Performed by: STUDENT IN AN ORGANIZED HEALTH CARE EDUCATION/TRAINING PROGRAM

## 2024-09-03 PROCEDURE — 94002 VENT MGMT INPAT INIT DAY: CPT

## 2024-09-03 RX ORDER — CALCIUM GLUCONATE 20 MG/ML
1 INJECTION, SOLUTION INTRAVENOUS ONCE
Status: COMPLETED | OUTPATIENT
Start: 2024-09-03 | End: 2024-09-03

## 2024-09-03 RX ORDER — ETOMIDATE 2 MG/ML
INJECTION INTRAVENOUS
Status: DISCONTINUED | OUTPATIENT
Start: 2024-09-03 | End: 2024-09-03

## 2024-09-03 RX ORDER — HYDROCODONE BITARTRATE AND ACETAMINOPHEN 500; 5 MG/1; MG/1
TABLET ORAL
Status: DISCONTINUED | OUTPATIENT
Start: 2024-09-03 | End: 2024-09-03

## 2024-09-03 RX ORDER — IOPAMIDOL 755 MG/ML
150 INJECTION, SOLUTION INTRAVASCULAR
Status: COMPLETED | OUTPATIENT
Start: 2024-09-03 | End: 2024-09-03

## 2024-09-03 RX ORDER — PHENYLEPHRINE HCL IN 0.9% NACL 1 MG/10 ML
SYRINGE (ML) INTRAVENOUS
Status: DISCONTINUED | OUTPATIENT
Start: 2024-09-03 | End: 2024-09-03

## 2024-09-03 RX ORDER — CALCIUM CHLORIDE INJECTION 100 MG/ML
INJECTION, SOLUTION INTRAVENOUS
Status: DISCONTINUED | OUTPATIENT
Start: 2024-09-03 | End: 2024-09-03

## 2024-09-03 RX ORDER — EPINEPHRINE 0.1 MG/ML
INJECTION INTRAVENOUS
Status: DISCONTINUED
Start: 2024-09-03 | End: 2024-09-03 | Stop reason: WASHOUT

## 2024-09-03 RX ORDER — EPHEDRINE SULFATE 50 MG/ML
INJECTION, SOLUTION INTRAVENOUS
Status: COMPLETED
Start: 2024-09-03 | End: 2024-09-03

## 2024-09-03 RX ORDER — ROCURONIUM BROMIDE 10 MG/ML
INJECTION, SOLUTION INTRAVENOUS
Status: DISCONTINUED | OUTPATIENT
Start: 2024-09-03 | End: 2024-09-03

## 2024-09-03 RX ORDER — HYDROCODONE BITARTRATE AND ACETAMINOPHEN 500; 5 MG/1; MG/1
TABLET ORAL
Status: DISCONTINUED | OUTPATIENT
Start: 2024-09-03 | End: 2024-09-06 | Stop reason: HOSPADM

## 2024-09-03 RX ORDER — LIDOCAINE HYDROCHLORIDE 20 MG/ML
INJECTION, SOLUTION EPIDURAL; INFILTRATION; INTRACAUDAL; PERINEURAL
Status: DISCONTINUED | OUTPATIENT
Start: 2024-09-03 | End: 2024-09-03

## 2024-09-03 RX ORDER — ETOMIDATE 2 MG/ML
INJECTION INTRAVENOUS
Status: COMPLETED
Start: 2024-09-03 | End: 2024-09-03

## 2024-09-03 RX ORDER — EPHEDRINE SULFATE 50 MG/ML
INJECTION, SOLUTION INTRAVENOUS
Status: DISCONTINUED | OUTPATIENT
Start: 2024-09-03 | End: 2024-09-03

## 2024-09-03 RX ADMIN — SODIUM CHLORIDE 8 MG/HR: 900 INJECTION INTRAVENOUS at 06:09

## 2024-09-03 RX ADMIN — ROCURONIUM BROMIDE 20 MG: 10 SOLUTION INTRAVENOUS at 02:09

## 2024-09-03 RX ADMIN — ETOMIDATE 18 MG: 2 INJECTION INTRAVENOUS at 11:09

## 2024-09-03 RX ADMIN — IOPAMIDOL 150 ML: 755 INJECTION, SOLUTION INTRAVENOUS at 03:09

## 2024-09-03 RX ADMIN — Medication 100 MCG: at 01:09

## 2024-09-03 RX ADMIN — CALCIUM CHLORIDE INJECTION 500 MG: 100 INJECTION, SOLUTION INTRAVENOUS at 02:09

## 2024-09-03 RX ADMIN — DEXTROSE MONOHYDRATE 2 G: 50 INJECTION, SOLUTION INTRAVENOUS at 01:09

## 2024-09-03 RX ADMIN — LIDOCAINE HYDROCHLORIDE 80 MG: 20 INJECTION, SOLUTION INTRAVENOUS at 11:09

## 2024-09-03 RX ADMIN — SODIUM CHLORIDE 8 MG/HR: 900 INJECTION INTRAVENOUS at 04:09

## 2024-09-03 RX ADMIN — Medication 100 MCG: at 11:09

## 2024-09-03 RX ADMIN — SODIUM CHLORIDE: 9 INJECTION, SOLUTION INTRAVENOUS at 01:09

## 2024-09-03 RX ADMIN — PROPOFOL 45 MCG/KG/MIN: 10 INJECTION, EMULSION INTRAVENOUS at 12:09

## 2024-09-03 RX ADMIN — SODIUM CHLORIDE 8 MG/HR: 900 INJECTION INTRAVENOUS at 12:09

## 2024-09-03 RX ADMIN — PROPOFOL 45 MCG/KG/MIN: 10 INJECTION, EMULSION INTRAVENOUS at 01:09

## 2024-09-03 RX ADMIN — NOREPINEPHRINE BITARTRATE 0.14 MCG/KG/MIN: 8 INJECTION, SOLUTION INTRAVENOUS at 09:09

## 2024-09-03 RX ADMIN — EPHEDRINE SULFATE 10 MG: 50 INJECTION INTRAVENOUS at 11:09

## 2024-09-03 RX ADMIN — SODIUM CHLORIDE, SODIUM GLUCONATE, SODIUM ACETATE, POTASSIUM CHLORIDE AND MAGNESIUM CHLORIDE: 526; 502; 368; 37; 30 INJECTION, SOLUTION INTRAVENOUS at 11:09

## 2024-09-03 RX ADMIN — CALCIUM GLUCONATE 1 G: 20 INJECTION, SOLUTION INTRAVENOUS at 04:09

## 2024-09-03 RX ADMIN — ROCURONIUM BROMIDE 30 MG: 10 SOLUTION INTRAVENOUS at 01:09

## 2024-09-03 RX ADMIN — SODIUM CHLORIDE 8 MG/HR: 900 INJECTION INTRAVENOUS at 10:09

## 2024-09-03 RX ADMIN — PROPOFOL 45 MCG/KG/MIN: 10 INJECTION, EMULSION INTRAVENOUS at 05:09

## 2024-09-03 RX ADMIN — ROCURONIUM BROMIDE 50 MG: 10 SOLUTION INTRAVENOUS at 11:09

## 2024-09-03 RX ADMIN — PROPOFOL 50 MCG/KG/MIN: 10 INJECTION, EMULSION INTRAVENOUS at 10:09

## 2024-09-03 NOTE — ANESTHESIA POSTPROCEDURE EVALUATION
Anesthesia Post Evaluation    Patient: Tony Zarate    Procedure(s) Performed: Procedure(s) (LRB):  EGD (ESOPHAGOGASTRODUODENOSCOPY) (N/A)    Final Anesthesia Type: general      Patient location during evaluation: ICU  Patient participation: No - Unable to Participate, Intubation  Level of consciousness: sedated  Post-procedure vital signs: reviewed and stable  Pain management: adequate      Anesthetic complications: no      Cardiovascular status: hemodynamically stable  Respiratory status: intubated and ventilator  Hydration status: euvolemic  Follow-up not needed.              Vitals Value Taken Time   /61 09/03/24 1236   Temp 36.9 °C (98.4 °F) 09/03/24 1100   Pulse 90 09/03/24 1238   Resp 20 09/03/24 1120   SpO2 100 % 09/03/24 1238   Vitals shown include unfiled device data.      No case tracking events are documented in the log.      Pain/Jimbo Score: Pain Rating Prior to Med Admin: 6 (9/2/2024  2:28 PM)

## 2024-09-03 NOTE — TRANSFER OF CARE
"Anesthesia Transfer of Care Note    Patient: Tony Zarate    Procedure(s) Performed: Procedure(s) (LRB):  EGD (ESOPHAGOGASTRODUODENOSCOPY) (N/A)    Patient location: ICU    Anesthesia Type: general    Transport from OR: Continuous ECG monitoring in transport. Continuous SpO2 monitoring in transport. Upon arrival to PACU/ICU, patient attached to ventilator and auscultated to confirm bilateral breath sounds and adequate TV. Transported from OR intubated on 100% O2 by AMBU with adequate controlled ventilation    Post pain: adequate analgesia    Post assessment: no apparent anesthetic complications and tolerated procedure well    Post vital signs: stable    Level of consciousness: sedated    Nausea/Vomiting: no nausea/vomiting    Complications: none    Transfer of care protocol was followed      Last vitals: Visit Vitals  /65   Pulse 105   Temp 36.9 °C (98.4 °F)   Resp 20   Ht 5' 7" (1.702 m)   Wt 74.8 kg (165 lb)   SpO2 100%   BMI 25.84 kg/m²     "

## 2024-09-03 NOTE — NURSING
Nurses Note -- 4 Eyes      9/3/2024   5:39 PM      Skin assessed during: Q Shift Change      [x] No Altered Skin Integrity Present    [x]Prevention Measures Documented      [] Yes- Altered Skin Integrity Present or Discovered   [] LDA Added if Not in Epic (Describe Wound)   [] New Altered Skin Integrity was Present on Admit and Documented in LDA   [] Wound Image Taken    Wound Care Consulted? No    Attending Nurse:  Chi Chavarria RN/Staff Member:   Anny WORTHINGTON

## 2024-09-03 NOTE — ANESTHESIA PREPROCEDURE EVALUATION
09/03/2024  Tony Zarate is a 54 y.o., male  male s/p EGD for hemorrhage control now with massive GI bleed, hemorrhagic shock status post EGD, with sclerotherapy/cautery, required 9 units PRBCs, 4 FFP 2 platelets, and 10 units of cryo, concern for still ongoing bleeding called Interventional Radiology awaiting their call back, also will consult vascular surgery discussed with general surgery they recommend IR intervention.      H/H: 9/24 at 9am  Plt: 92K  INR: 1.2      Pre-op Assessment    I have reviewed the Patient Summary Reports.     I have reviewed the Nursing Notes. I have reviewed the NPO Status.   I have reviewed the Medications.     Review of Systems  Anesthesia Hx:  No problems with previous Anesthesia                Social:  Non-Smoker       Renal/:   renal calculi                   Physical Exam  General: Well nourished  Sedated on Propofol infusion 45 mcg/kg/min  Airway:  Mallampati: unable to assess   TM Distance: Normal  Pre-Existing Airway: Oral Endotracheal tube    Dental:  Intact    Chest/Lungs:  Clear to auscultation, Normal Respiratory Rate  Vent: /20/.40/+8    Heart:  Rate: Normal  Rhythm: Regular Rhythm  Sounds: Normal  Levophed at 0.22 mcg/kg/min  Abdomen:  Normal, Soft, Nontender        Anesthesia Plan  Type of Anesthesia, risks & benefits discussed:    Anesthesia Type: Gen ETT  Intra-op Monitoring Plan: Standard ASA Monitors and Art Line  Post Op Pain Control Plan: multimodal analgesia  Induction:  IV  Airway Plan: Direct  Informed Consent: Informed consent signed with the Patient and all parties understand the risks and agree with anesthesia plan.  All questions answered.   ASA Score: 4 Emergent  Day of Surgery Review of History & Physical: H&P Update referred to the surgeon/provider.    Ready For Surgery From Anesthesia Perspective.     .

## 2024-09-03 NOTE — PLAN OF CARE
Problem: Adult Inpatient Plan of Care  Goal: Plan of Care Review  9/3/2024 1722 by Chi Mccarthy RN  Outcome: Progressing  9/3/2024 1721 by Chi Mccarthy RN  Outcome: Progressing  9/3/2024 1721 by Chi Mccarthy RN  Outcome: Progressing  Goal: Patient-Specific Goal (Individualized)  9/3/2024 1722 by Chi Mccarthy RN  Outcome: Progressing  9/3/2024 1721 by Chi Mccarthy RN  Outcome: Progressing  9/3/2024 1721 by Chi Mccarthy RN  Outcome: Progressing  Goal: Absence of Hospital-Acquired Illness or Injury  9/3/2024 1722 by Chi Mccarthy RN  Outcome: Progressing  9/3/2024 1721 by Chi Mccarthy RN  Outcome: Progressing  9/3/2024 1721 by Chi Mccarthy RN  Outcome: Progressing  Goal: Optimal Comfort and Wellbeing  9/3/2024 1722 by Chi Mccarthy RN  Outcome: Progressing  9/3/2024 1721 by Chi Mccarthy RN  Outcome: Progressing  9/3/2024 1721 by Chi Mccarthy RN  Outcome: Progressing  Goal: Readiness for Transition of Care  9/3/2024 1722 by Chi Mccarthy RN  Outcome: Progressing  9/3/2024 1721 by Chi Mccarthy RN  Outcome: Progressing  9/3/2024 1721 by Chi Mccarthy RN  Outcome: Progressing     Problem: Fall Injury Risk  Goal: Absence of Fall and Fall-Related Injury  9/3/2024 1722 by Chi Mccarthy RN  Outcome: Progressing  9/3/2024 1721 by Chi Mccarthy RN  Outcome: Progressing  9/3/2024 1721 by Chi Mccarthy RN  Outcome: Progressing     Problem: Skin Injury Risk Increased  Goal: Skin Health and Integrity  9/3/2024 1722 by Chi Mccarthy RN  Outcome: Progressing  9/3/2024 1721 by Chi Mccarthy RN  Outcome: Progressing  9/3/2024 1721 by Chi Mccarthy RN  Outcome: Progressing     Problem: Infection  Goal: Absence of Infection Signs and Symptoms  9/3/2024 1722 by Chi Mccarthy RN  Outcome: Progressing  9/3/2024 1721 by Chi Mccarthy, RN  Outcome: Progressing  9/3/2024 1721 by Chi Mccarthy, RN  Outcome: Progressing

## 2024-09-03 NOTE — ANESTHESIA PROCEDURE NOTES
Intubation    Date/Time: 9/3/2024 11:25 AM    Performed by: Valerie Keith CRNA  Authorized by: Cierra Brumfield MD    Intubation:     Induction:  Intravenous    Intubated:  Postinduction    Mask Ventilation:  Easy mask    Attempts:  1    Attempted By:  CRNA    Method of Intubation:  Video laryngoscopy    Blade:  Glidescope 3    Laryngeal View Grade: Grade I - full view of cords      Difficult Airway Encountered?: No      Complications:  None    Airway Device:  Oral endotracheal tube    Airway Device Size:  7.5    Style/Cuff Inflation:  Cuffed    Inflation Amount (mL):  6    Tube secured:  25    Secured at:  The lips (previous tube securement place)    Placement Verified By:  Capnometry    Complicating Factors:  None    Findings Post-Intubation:  BS equal bilateral and atraumatic/condition of teeth unchanged

## 2024-09-03 NOTE — ANESTHESIA PREPROCEDURE EVALUATION
"                                                                                                             09/03/2024  Tony Zarate is a 54 y.o., male s/p EGD for hemorrhage control now with ongoing GI Bleed for repeat EGD    "   - 54-year-old with massive GI bleed, hemorrhagic shock status post EGD, with sclerotherapy/cautery, required 9 units PRBCs, 4 FFP 2 platelets, and 10 units of cryo, concern for still ongoing bleeding called Interventional Radiology awaiting their call back, also will consult vascular surgery discussed with general surgery they recommend IR intervention     -Self extubated, on nasal cannula good oxygenation ventilation  -Hemorrhagic Shock ,  on pressors still actively bleeding, continue aggressive transfusions, discussed with GI they going to come rescoped         Discuss updated patient's wife on current circumstances plan of care."    Plan is for patient to be reintubated by ICU team and then rescope by GI - will provide sedation and HD support for procedure            Pre-op Assessment    I have reviewed the NPO Status.      Review of Systems      Physical Exam  General: Well nourished, Cooperative, Alert and Oriented  Pale, appears weak and acutely ill  Airway:  Mallampati: II   Mouth Opening: Normal  TM Distance: Normal  Tongue: Normal  Neck ROM: Normal ROM    Dental:  Intact    Chest/Lungs:  Clear to auscultation, Normal Respiratory Rate    Heart:  Rate: Normal  Rhythm: Regular Rhythm        Anesthesia Plan  Type of Anesthesia, risks & benefits discussed:    Anesthesia Type: Gen ETT  Intra-op Monitoring Plan: Standard ASA Monitors  Post Op Pain Control Plan: IV/PO Opioids PRN and multimodal analgesia  Induction:  IV  Airway Plan: Direct  Informed Consent: Informed consent signed with the Patient representative and all parties understand the risks and agree with anesthesia plan.  All questions answered. Patient consented to blood products? No  ASA Score: 3 Emergent  Day of Surgery " Review of History & Physical: H&P Update referred to the surgeon/provider.  Anesthesia Plan Notes:   Technique: GETA /RSI   PONV Prophylaxis   ICU Postop - likely will remain intubated - discussed with patient and wife might need further procedures       Ready For Surgery From Anesthesia Perspective.     .

## 2024-09-03 NOTE — PROCEDURES
"Tony Zarate is a 54 y.o. male patient.    Temp: 98.4 °F (36.9 °C) (09/03/24 1315)  Pulse: 86 (09/03/24 1315)  Resp: 20 (09/03/24 1120)  BP: 132/66 (09/03/24 1315)  SpO2: 100 % (09/03/24 1315)  Weight: 74.8 kg (165 lb) (09/01/24 2222)  Height: 5' 7" (170.2 cm) (09/01/24 2222)       Dialysis catheter    Date/Time: 9/3/2024 5:14 PM    Performed by: Tati Gasca MD  Authorized by: Tati Gasca MD    Location procedure was performed:  [address] INTENSIVE CARE UNIT  Assisting Provider:  Natasha Garza MD  Consent Done ?:  Yes  Indications:  Med administration  Anesthesia:  Local infiltration  Local anesthetic:  Lidocaine 1% without epinephrine  Preparation:  Skin prepped with ChloraPrep  Skin prep agent dried: Skin prep agent completely dried prior to procedure    Sterile barriers: All five maximal sterile barriers used - gloves, gown, cap, mask and large sterile sheet    Hand hygiene: Hand hygiene performed immediately prior to central venous catheter insertion    Location:  Right internal jugular  Catheter type:  Dialysis  Catheter size:  7 Fr  Ultrasound guidance: Yes    Vessel Caliber:  Small   patent  Comprressibility:  Normal  Doppler:  Not done  Needle advanced into vessel with real time ultrasound guidance.    Guidewire confirmed in vessel.    Image recorded and saved.    Steril sheath on probe.    Sterile gel used.  Manometry: No    Number of attempts:  1  Securement:  Line sutured and sterile dressing applied  Technical Procedures Used:  Us guided  Complications: No    Estimated blood loss (mL):  3  Specimens: No    Implants: No    XRay:  Placement verified by x-ray and no pneumothorax on x-ray  Adverse Events:  NoneTermination Site: right atrium      9/3/2024    "

## 2024-09-03 NOTE — TRANSFER OF CARE
"Anesthesia Transfer of Care Note    Patient: Tony Zarate    Procedure(s) Performed: * No procedures listed *    Patient location: ICU    Anesthesia Type: general    Transport from OR: Transported from OR intubated on 100% O2  with adequate ventilation controlled by transport ventilator. Upon arrival to PACU/ICU, patient attached to ventilator and auscultated to confirm bilateral breath sounds and adequate TV. Continuous ECG monitoring in transport. Continuous SpO2 monitoring in transport    Post pain: adequate analgesia    Post assessment: no apparent anesthetic complications and tolerated procedure well    Post vital signs: stable    Level of consciousness: sedated    Nausea/Vomiting: no nausea/vomiting    Complications: none    Transfer of care protocol was followed      Last vitals: Visit Vitals  /66   Pulse 86   Temp 36.9 °C (98.4 °F) (Oral)   Resp 20   Ht 5' 7" (1.702 m)   Wt 74.8 kg (165 lb)   SpO2 100%   BMI 25.84 kg/m²     "

## 2024-09-03 NOTE — NURSING
0830: Upon arrival into patients room he had self extubated. Sating high 90's-100 on RA. Applied nasal cannula @ 2L prophylactically. Doctor notified, respiratory notified.

## 2024-09-03 NOTE — PROGRESS NOTES
Acute Care Surgery   Progress Note  Admit Date: 9/1/2024  HD#2  POD#1 Day Post-Op    Subjective:   Interval history:  S/p bedside EGD w/ GI 9/2  Received 2u pRBCs, Hgb 8.3 from 6.2, receiving 3rd unit this AM  Intubated - stable vent settings, sedated on propofol  Levo 0.06 from 0.1  UOP 1425cc/24hrs  Bmx7, multiple dark blood Bms    Home Meds:  Current Outpatient Medications   Medication Instructions    ondansetron (ZOFRAN) 4 mg, Oral, Every 6 hours    tamsulosin (FLOMAX) 0.4 mg, Oral, Daily      Scheduled Meds:   [START ON 9/4/2024] mupirocin   Nasal BID     Continuous Infusions:   lactated ringers   Intravenous Continuous   Stopped at 09/02/24 1116    NORepinephrine bitartrate-D5W  0-3 mcg/kg/min Intravenous Continuous 11.2 mL/hr at 09/03/24 0623 0.08 mcg/kg/min at 09/03/24 0623    pantoprazole (PROTONIX) IV infusion  8 mg/hr Intravenous Continuous 20 mL/hr at 09/03/24 0654 8 mg/hr at 09/03/24 0654    propofoL  0-50 mcg/kg/min Intravenous Continuous 20.2 mL/hr at 09/03/24 0623 45 mcg/kg/min at 09/03/24 0623     PRN Meds:  Current Facility-Administered Medications:     0.9%  NaCl infusion (for blood administration), , Intravenous, Q24H PRN    0.9%  NaCl infusion (for blood administration), , Intravenous, Q24H PRN    0.9%  NaCl infusion (for blood administration), , Intravenous, Q24H PRN    0.9%  NaCl infusion (for blood administration), , Intravenous, Q24H PRN    0.9%  NaCl infusion (for blood administration), , Intravenous, Q24H PRN    0.9%  NaCl infusion (for blood administration), , Intravenous, Q24H PRN    0.9%  NaCl infusion (for blood administration), , Intravenous, Q24H PRN    0.9%  NaCl infusion (for blood administration), , Intravenous, Q24H PRN    0.9%  NaCl infusion (for blood administration), , Intravenous, Q24H PRN    0.9%  NaCl infusion (for blood administration), , Intravenous, Q24H PRN    fentaNYL, 50 mcg, Intravenous, Q2H PRN    melatonin, 6 mg, Oral, Nightly PRN    ondansetron, 4 mg,  "Intravenous, Q6H PRN    ondansetron, 4 mg, Intravenous, Q8H PRN    sodium chloride 0.9%, 10 mL, Intravenous, PRN     Objective:     VITAL SIGNS: 24 HR MIN & MAX LAST   Temp  Min: 98.1 °F (36.7 °C)  Max: 99.3 °F (37.4 °C)  98.1 °F (36.7 °C)   BP  Min: 72/47  Max: 134/81  (!) 96/57    Pulse  Min: 65  Max: 103  73    Resp  Min: 12  Max: 30  20    SpO2  Min: 97 %  Max: 100 %  99 %      HT: 5' 7" (170.2 cm)  WT: 74.8 kg (165 lb)  BMI: 25.8     Intake/output:  Intake/Output - Last 3 Shifts         09/01 0700 09/02 0659 09/02 0700 09/03 0659 09/03 0700 09/04 0659    P.O. 0 0     I.V. (mL/kg) 74.7 (1) 1622 (21.7)     Blood 1090.8 30483     IV Piggyback  1000     Total Intake(mL/kg) 1165.5 (15.6) 46500 (177.2)     Urine (mL/kg/hr)  1525 (0.8)     Stool  0     Total Output  1525     Net +1165.5 +94798            Urine Occurrence 1 x 3 x     Stool Occurrence 1 x 7 x             Intake/Output Summary (Last 24 hours) at 9/3/2024 0716  Last data filed at 9/3/2024 0655  Gross per 24 hour   Intake 45726.98 ml   Output 1525 ml   Net 39156.98 ml           Lines/drains/airway:       Peripheral IV - Single Lumen 09/02/24 0145 18 G Anterior;Right Forearm (Active)   Site Assessment Clean;Dry;Intact 09/03/24 0400   Extremity Assessment Distal to IV No abnormal discoloration 09/03/24 0400   Line Status Infusing 09/03/24 0400   Dressing Status Clean;Dry;Intact 09/03/24 0400   Dressing Intervention First dressing 09/03/24 0400   Number of days: 1            Peripheral IV - Single Lumen 09/02/24 0400 20 G Anterior;Left Upper Arm (Active)   Site Assessment Clean;Dry;Intact 09/03/24 0400   Extremity Assessment Distal to IV No abnormal discoloration 09/03/24 0400   Line Status Infusing 09/03/24 0400   Dressing Status Clean;Dry;Intact 09/03/24 0400   Dressing Intervention First dressing 09/03/24 0400   Number of days: 1            Peripheral IV - Single Lumen 09/02/24 1300 20 G Anterior;Left Upper Arm (Active)   Site Assessment " "Clean;Dry;Intact 09/03/24 0400   Extremity Assessment Distal to IV No abnormal discoloration 09/03/24 0400   Line Status Flushed;Saline locked 09/03/24 0400   Dressing Status Clean;Dry;Intact 09/03/24 0400   Dressing Intervention Integrity maintained 09/03/24 0400   Number of days: 0            Urethral Catheter 09/02/24 1600 (Active)   $ Lewis Insertion Bedside Insertion Performed 09/02/24 1600   Site Assessment Clean;Intact;Dry 09/03/24 0400   Collection Container Urimeter 09/03/24 0400   Securement Method secured to top of thigh w/ adhesive device 09/03/24 0400   Catheter Care Performed yes 09/03/24 0400   Reason for Continuing Urinary Catheterization Urinary retention 09/03/24 0400   CAUTI Prevention Bundle Securement Device in place with 1" slack;Intact seal between catheter & drainage tubing;Drainage bag/urimeter off the floor;Sheeting clip in use;No dependent loops or kinks;Drainage bag/urimeter not overfilled (<2/3 full);Drainage bag/urimeter below bladder 09/02/24 2000   Output (mL) 100 mL 09/03/24 0600   Number of days: 0       Physical examination:  General: intubated and sedated  CV: RR  Resp: intubated and mechanically ventilated  GI:  Abdomen soft, mildly distended  :  Deferred  MSK: No muscle atrophy, cyanosis, peripheral edema  Skin/wounds:  No rashes, ulcers, erythema  Neuro: sedated    Labs:  Renal:  Recent Labs     09/01/24 1741 09/02/24  0730 09/03/24  0454   BUN 35.9* 31.3* 28.5*   CREATININE 0.86 0.75 0.76     No results for input(s): "LACTIC" in the last 72 hours.  FENGI:  Recent Labs     09/01/24 1741 09/02/24  0730 09/03/24  0454    140 140   K 3.7 4.2 4.0   * 109* 112*   CO2 22 22 22   CALCIUM 8.9 8.1* 7.1*   ALBUMIN 3.6 2.9* 2.2*   BILITOT 0.4 0.5 0.3   AST 14 12 12   ALKPHOS 48 37* 34*   ALT 15 11 8     Heme:  Recent Labs     09/01/24 1741 09/01/24  2239 09/02/24  0730 09/02/24  1528 09/03/24  0020 09/03/24  0454   HGB 9.6*   < > 10.5* 7.0* 6.2* 8.3*   HCT 29.2*   < > " "30.3* 20.4* 18.6* 24.6*     --  135  --  104* 93*   INR 1.1  --   --   --   --  1.2    < > = values in this interval not displayed.     ID:  Recent Labs     09/01/24  1741 09/02/24  0730 09/03/24  0020 09/03/24  0454   WBC 8.14 9.00 12.57* 13.36*     CBG:  Recent Labs     09/01/24 1741 09/02/24  0730 09/03/24  0454   GLUCOSE 132* 122* 129*      Cardiovascular:  No results for input(s): "TROPONINI", "CKTOTAL", "CKMB", "BNP" in the last 168 hours.  ABG:  Recent Labs   Lab 09/02/24  1617   PH 7.430   PO2 270.0*   PCO2 41.0   HCO3 27.2*      I have reviewed all pertinent lab results within the past 24 hours.    Imaging:  X-Ray Chest 1 View         X-Ray Chest 1 View   Final Result      Endotracheal tube tip 3.5 cm above the samir.         Electronically signed by: Eyad Torres   Date:    09/02/2024   Time:    13:30         I have reviewed all pertinent imaging results/findings within the past 24 hours.    Micro/Path/Other:  Microbiology Results (last 7 days)       ** No results found for the last 168 hours. **           Pathology Results  (Last 7 days)      None           Assessment & Plan:   55 yo male with no significant PMHx who presented from OSH due to hematochezia and syncopal episode found to have bleeding ulcer in duodenal bulb on EGD    - no acute surgical intervention at this time  - serial CBCs, transfuse for Hgb <7  - continue PPI  - f/u H.pylori  - if pt continues to have refractory active bleed, consider IR for embolization  - surgery will continue to follow  - remainder of care per primary    Dayana Alston MD  [employer] General Surgery PGY2  "

## 2024-09-03 NOTE — NURSING
Addended by: GRISELDA SCHULZ on: 3/17/2022 06:07 PM     Modules accepted: Orders     Nurses Note -- 4 Eyes      9/2/2024   7:36 PM      Skin assessed during: Admit      [x] No Altered Skin Integrity Present    [x]Prevention Measures Documented      [] Yes- Altered Skin Integrity Present or Discovered   [] LDA Added if Not in Epic (Describe Wound)   [] New Altered Skin Integrity was Present on Admit and Documented in LDA   [] Wound Image Taken    Wound Care Consulted? No    Attending Nurse:  Anny Chavarria RN/Staff Member:   Veena WORTHINGTON RN

## 2024-09-03 NOTE — INTERVAL H&P NOTE
The patient has been examined and the H&P has been reviewed:    I concur with the findings and no changes have occurred since H&P was written. 55 yo M with upper GI bleed presents for embolization (A region).  Case deemed emergent for consent purposes.        Active Hospital Problems    Diagnosis  POA    *GIB (gastrointestinal bleeding) [K92.2]  Yes      Resolved Hospital Problems   No resolved problems to display.

## 2024-09-03 NOTE — PROGRESS NOTES
[facility] - Oncology Acute  Pulmonary Critical Care Note    Patient Name: Tony Zarate  MRN: [id number]  Admission Date: 9/1/2024  Hospital Length of Stay: 2 days  Code Status: Full Code  Attending Provider: Natasha Garza MD  Primary Care Provider: Mirlande Zarate NP     Subjective:     HPI:   Patient is a 54-year-old male with no significant past medical history who presented to an outside ER with a complaint of bloody bowel movements multi multiple times over the last 1 day with an associated syncopal episode.  At the prior facility, workup showed a drop in his hemoglobin from 16 to 9 and patient was transferred to [facility] for GI services.  CT abdomen displayed no acute pathology.  On arrival to our facility, patient had an episode of hematemesis (approximately 15 cc according to nursing staff) and another syncopal episode.  Patient states that he has been noticing multiple episodes of black/tarry stools over the last day.  He denies fever, chills, other prior episodes of hematemesis, abdominal pain, history of GERD, hematochezia.  Patient denies risk factors such as NSAID use, alcohol use, and tobacco abuse.  Patient denies history of previous GI bleed or GI pathology in the past.  Patient states that he has never underwent EGD or colonoscopy in the past.      Patient was evaluated by GI who recommended ICU admission.  Patient was started on IV PPI infusion and transfused 3 units pRBC.  Planning for EGD tomorrow morning.    24 Hour Interval History  EGD performed by GI yesterday, bleeding ulcer in duodenal bulb. Currently intubated. On PPI gtt. Currently being transfused 2U pRBC. Will check CBC after completion and consider extubation if stable.         Past Surgical History:   Procedure Laterality Date    EGD, WITH HEMORRHAGE CONTROL  9/2/2024    Procedure: EGD,WITH HEMORRHAGE CONTROL;  Surgeon: Jason Napier MD;  Location: [facility] ENDOSCOPY;  Service: Gastroenterology;;  c epi     ESOPHAGOGASTRODUODENOSCOPY N/A 9/2/2024    Procedure: EGD (ESOPHAGOGASTRODUODENOSCOPY);  Surgeon: Jason Napier MD;  Location: [facility] ENDOSCOPY;  Service: Gastroenterology;  Laterality: N/A;       Social History     Socioeconomic History    Marital status:      Current Outpatient Medications   Medication Instructions    ondansetron (ZOFRAN) 4 mg, Oral, Every 6 hours    tamsulosin (FLOMAX) 0.4 mg, Oral, Daily       Current Inpatient Medications   [START ON 9/4/2024] mupirocin   Nasal BID       Current Intravenous Infusions   lactated ringers   Intravenous Continuous   Stopped at 09/02/24 1116    NORepinephrine bitartrate-D5W  0-3 mcg/kg/min Intravenous Continuous 11.2 mL/hr at 09/03/24 0623 0.08 mcg/kg/min at 09/03/24 0623    pantoprazole (PROTONIX) IV infusion  8 mg/hr Intravenous Continuous 20 mL/hr at 09/03/24 0623 8 mg/hr at 09/03/24 0623    propofoL  0-50 mcg/kg/min Intravenous Continuous 20.2 mL/hr at 09/03/24 0623 45 mcg/kg/min at 09/03/24 0623         Review of Systems   Unable to perform ROS: Intubated          Objective:       Intake/Output Summary (Last 24 hours) at 9/3/2024 0630  Last data filed at 9/3/2024 0623  Gross per 24 hour   Intake 78895.15 ml   Output 1525 ml   Net 75913.15 ml         Vital Signs (Most Recent):  Temp: 98.2 °F (36.8 °C) (09/03/24 0515)  Pulse: 74 (09/03/24 0615)  Resp: (!) 21 (09/03/24 0615)  BP: (!) 100/53 (09/03/24 0615)  SpO2: 100 % (09/03/24 0615)  Body mass index is 25.84 kg/m².  Weight: 74.8 kg (165 lb) Vital Signs (24h Range):  Temp:  [98.1 °F (36.7 °C)-99.3 °F (37.4 °C)] 98.2 °F (36.8 °C)  Pulse:  [] 74  Resp:  [12-30] 21  SpO2:  [96 %-100 %] 100 %  BP: ()/(39-87) 100/53     Physical Exam  Vitals reviewed.   Constitutional:       Comments: Sedated, intubated   Eyes:      Comments: Conjunctival pallor   Cardiovascular:      Rate and Rhythm: Normal rate and regular rhythm.      Heart sounds: No murmur heard.  Pulmonary:      Comments: Ventilator  associated breath sounds  Abdominal:      General: There is no distension.      Palpations: Abdomen is soft.   Musculoskeletal:      Right lower leg: No edema.      Left lower leg: No edema.   Skin:     Capillary Refill: Capillary refill takes 2 to 3 seconds.         Lines/Drains/Airways       Drain  Duration                  Urethral Catheter 09/02/24 1600 <1 day              Airway  Duration                  Airway - Non-Surgical 09/02/24 1220 <1 day              Peripheral Intravenous Line  Duration                  Peripheral IV - Single Lumen 09/02/24 0145 18 G Anterior;Right Forearm 1 day         Peripheral IV - Single Lumen 09/02/24 0400 20 G Anterior;Left Upper Arm 1 day         Peripheral IV - Single Lumen 09/02/24 1300 20 G Anterior;Left Upper Arm <1 day                    Significant Labs:    Lab Results   Component Value Date    WBC 13.36 (H) 09/03/2024    HGB 8.3 (L) 09/03/2024    HCT 24.6 (L) 09/03/2024    MCV 90.4 09/03/2024    PLT 93 (L) 09/03/2024           BMP  Lab Results   Component Value Date     09/03/2024    K 4.0 09/03/2024    CO2 22 09/03/2024    BUN 28.5 (H) 09/03/2024    CREATININE 0.76 09/03/2024    CALCIUM 7.1 (L) 09/03/2024    AGAP 6.0 09/03/2024    EGFRNONAA >60 10/03/2017       ABG  Recent Labs   Lab 09/02/24  1617   PH 7.430   PO2 270.0*   PCO2 41.0   HCO3 27.2*   POCBASEDEF 2.60       Mechanical Ventilation Support:  Vent Mode: A/C (09/03/24 0535)  Set Rate: 20 BPM (09/03/24 0535)  Vt Set: 450 mL (09/03/24 0535)  PEEP/CPAP: 8 cmH20 (09/03/24 0535)  Oxygen Concentration (%): 35 (09/03/24 0535)  Peak Airway Pressure: 30 cmH20 (09/03/24 0535)  Total Ve: 12 L/m (09/03/24 0535)  F/VT Ratio<105 (RSBI): (!) 49.78 (09/03/24 0320)  Significant Imaging:  I have reviewed the pertinent imaging within the past 24 hours.    Assessment/Plan:     Assessment  Acute upper GI bleed - bleeding ulcer in duodenal bulb  Acute blood loss anemia secondary to above  Syncope    Plan  Admitted to ICU  given active upper GI bleed with decreasing H/H  GI following, EGD completed, per recs  Consider extubation if H/H stable  Avoid NSAIDS or anticoagulations  Protonix gtt x 72 hrs, then switch to IV or oral  H pylori stool antigen ordered  Continue to hemodynamically monitor patient and assess need for further transfusion      DVT: SCD  GI Prophylaxis:  Protonix     32 minutes of critical care was time spent personally by me on the following activities: development of treatment plan with patient or surrogate and bedside caregivers, discussions with consultants, evaluation of patient's response to treatment, examination of patient, ordering and performing treatments and interventions, ordering and review of laboratory studies, ordering and review of radiographic studies, pulse oximetry, re-evaluation of patient's condition.  This critical care time did not overlap with that of any other provider or involve time for any procedures.     Dieudonne Spears MD  Pulmonary Critical Care Medicine  [facility] - Oncology Acute  DOS: 09/03/2024

## 2024-09-03 NOTE — OP NOTE
Radiology Post-Procedure Note    Pre Op Diagnosis: GIB (gastrointestinal bleeding)     Post Op Diagnosis: Same    Secondary Diagnoses:   Problem List Items Addressed This Visit          GI    * (Principal) GIB (gastrointestinal bleeding)    Relevant Orders    Case Request Endoscopy: EGD (ESOPHAGOGASTRODUODENOSCOPY) (Completed)    Case Request Endoscopy: EGD (ESOPHAGOGASTRODUODENOSCOPY) (Completed)     Other Visit Diagnoses       Acute blood loss anemia    -  Primary    Relevant Orders    Case Request Endoscopy: EGD (ESOPHAGOGASTRODUODENOSCOPY) (Completed)    Case Request Endoscopy: EGD (ESOPHAGOGASTRODUODENOSCOPY) (Completed)             Procedure: GDA Branch Coil Embolization    Procedure performed by: Will Costa MD    Assistant: Ayaz    Written Informed Consent Obtained: Yes    Specimen Removed: None    Estimated Blood Loss: 60 cc    Condition: Stable    Outcome: Patient tolerated treatment/procedure well without complication and is now ready for discharge.    Disposition: Transfer back to inpatient unit

## 2024-09-03 NOTE — PROCEDURES
Tony Zarate   MRN: [id number]   ADMISSION DATE: 2024  : 1970  AGE: 54 y.o.    DATE OF PROCEDURE:  2024     PROCEDURE:  EGD, diagnostic    SURGEON: ROSALINDA SHELLEY    PREOPERATIVE DIAGNOSIS:  Acute blood loss anemia, history of duodenal ulcer status was sclerotherapy/cautery for active oozing from.  Multiple blood products infusion, recurrent GI bleed    POSTOPERATIVE DIAGNOSIS:  Large blood clots, duodenal bulb/2nd portion consistent with recurrent bleed.  Not able to clear.  No therapeutic intervention could be attempted      HISTORY AND PHYSICAL:  As per preoperative note.      DESCRIPTION OF PROCEDURE:  Informed consent was obtained.  Patient was placed in supine position under general anesthesia  Sedation per anesthesia service.  Olympus video gastroscope was introduced into the oral cavity and esophagus was intubated under direct visualization. The scope was carefully advanced to the distal duodenum.  Patient tolerated the procedure well without any complications.    FINDINGS:  Esophagus:  Normal mucosa.  No significant abnormality noted  Stomach:  There was moderate amount of blood clots noted in gastric fundus, body and antrum suggestive of recurrent GI bleed.  No active ulcer site could be seen in the stomach to the extent of visualization.   Duodenum:  Duodenal bulb with large amount of blood clots present.  This extended to the postbulbar area into the proximal 2nd portion.  The distal 2nd portion appeared unremarkable.  There seemed to be fresh blood present suggestive of active bleed.  Attempts to remove the clot with a regular gastroscope was not successful.  This was replaced with double channel therapeutic gastroscope.  Unfortunately, due to technical reasons, therapeutic gastroscope could not be used.  The visualization was quite limited and hazy.  No other therapeutic scope were available at this time.  I decompressed stomach as much as possible.  Also bloody secretions were suctioned  from esophagus on withdrawal.      ESTIMATED BLOOD LOSS:  Significant GI bleed noted more than 30 cc during procedure.    COMPLICATIONS:  None.    RECOMMENDATIONS:  1. Continue PPI infusion.  2. Blood products as per ICU service.    3. Discussed these findings with ICU attending, Dr. Garza.  Interventional radiologist was already consulted earlier for therapeutic intervention this afternoon.  Findings discussed in detail patient's family, questions were answered.   4. General surgery on consult

## 2024-09-04 LAB
ALBUMIN SERPL-MCNC: 2.4 G/DL (ref 3.5–5)
ALBUMIN/GLOB SERPL: 1.7 RATIO (ref 1.1–2)
ALP SERPL-CCNC: 39 UNIT/L (ref 40–150)
ALT SERPL-CCNC: 12 UNIT/L (ref 0–55)
ANION GAP SERPL CALC-SCNC: 16 MMOL/L (ref 8–16)
ANION GAP SERPL CALC-SCNC: 17 MMOL/L (ref 8–16)
ANION GAP SERPL CALC-SCNC: 4 MEQ/L
AST SERPL-CCNC: 16 UNIT/L (ref 5–34)
BASOPHILS # BLD AUTO: 0.01 X10(3)/MCL
BASOPHILS # BLD AUTO: 0.02 X10(3)/MCL
BASOPHILS # BLD AUTO: 0.02 X10(3)/MCL
BASOPHILS NFR BLD AUTO: 0.2 %
BASOPHILS NFR BLD AUTO: 0.4 %
BASOPHILS NFR BLD AUTO: 0.4 %
BILIRUB SERPL-MCNC: 0.4 MG/DL
BUN SERPL-MCNC: 17.1 MG/DL (ref 8.4–25.7)
BUN SERPL-MCNC: 24 MG/DL (ref 6–30)
BUN SERPL-MCNC: 25 MG/DL (ref 6–30)
CALCIUM SERPL-MCNC: 7.5 MG/DL (ref 8.4–10.2)
CHLORIDE SERPL-SCNC: 104 MMOL/L (ref 95–110)
CHLORIDE SERPL-SCNC: 105 MMOL/L (ref 95–110)
CHLORIDE SERPL-SCNC: 113 MMOL/L (ref 98–107)
CO2 SERPL-SCNC: 26 MMOL/L (ref 22–29)
CREAT SERPL-MCNC: 0.7 MG/DL (ref 0.5–1.4)
CREAT SERPL-MCNC: 0.76 MG/DL (ref 0.73–1.18)
CREAT SERPL-MCNC: 0.8 MG/DL (ref 0.5–1.4)
CREAT/UREA NIT SERPL: 23
EOSINOPHIL # BLD AUTO: 0.12 X10(3)/MCL (ref 0–0.9)
EOSINOPHIL # BLD AUTO: 0.14 X10(3)/MCL (ref 0–0.9)
EOSINOPHIL # BLD AUTO: 0.17 X10(3)/MCL (ref 0–0.9)
EOSINOPHIL NFR BLD AUTO: 2.1 %
EOSINOPHIL NFR BLD AUTO: 2.5 %
EOSINOPHIL NFR BLD AUTO: 3.1 %
ERYTHROCYTE [DISTWIDTH] IN BLOOD BY AUTOMATED COUNT: 18.3 % (ref 11.5–17)
ERYTHROCYTE [DISTWIDTH] IN BLOOD BY AUTOMATED COUNT: 18.6 % (ref 11.5–17)
ERYTHROCYTE [DISTWIDTH] IN BLOOD BY AUTOMATED COUNT: 18.8 % (ref 11.5–17)
GFR SERPLBLD CREATININE-BSD FMLA CKD-EPI: >60 ML/MIN/1.73/M2
GLOBULIN SER-MCNC: 1.4 GM/DL (ref 2.4–3.5)
GLUCOSE SERPL-MCNC: 103 MG/DL (ref 74–100)
GLUCOSE SERPL-MCNC: 156 MG/DL (ref 70–110)
GLUCOSE SERPL-MCNC: 160 MG/DL (ref 70–110)
HCT VFR BLD AUTO: 26.2 % (ref 42–52)
HCT VFR BLD AUTO: 26.2 % (ref 42–52)
HCT VFR BLD AUTO: 28 % (ref 42–52)
HCT VFR BLD CALC: 16 %PCV (ref 36–54)
HCT VFR BLD CALC: 21 %PCV (ref 36–54)
HGB BLD-MCNC: 5 G/DL
HGB BLD-MCNC: 7 G/DL
HGB BLD-MCNC: 8.8 G/DL (ref 14–18)
HGB BLD-MCNC: 8.8 G/DL (ref 14–18)
HGB BLD-MCNC: 9.5 G/DL (ref 14–18)
IMM GRANULOCYTES # BLD AUTO: 0.02 X10(3)/MCL (ref 0–0.04)
IMM GRANULOCYTES # BLD AUTO: 0.03 X10(3)/MCL (ref 0–0.04)
IMM GRANULOCYTES # BLD AUTO: 0.03 X10(3)/MCL (ref 0–0.04)
IMM GRANULOCYTES NFR BLD AUTO: 0.3 %
IMM GRANULOCYTES NFR BLD AUTO: 0.5 %
IMM GRANULOCYTES NFR BLD AUTO: 0.6 %
LACTATE SERPL-SCNC: 0.8 MMOL/L (ref 0.5–2.2)
LACTATE SERPL-SCNC: 1 MMOL/L (ref 0.5–2.2)
LYMPHOCYTES # BLD AUTO: 0.58 X10(3)/MCL (ref 0.6–4.6)
LYMPHOCYTES # BLD AUTO: 0.61 X10(3)/MCL (ref 0.6–4.6)
LYMPHOCYTES # BLD AUTO: 0.64 X10(3)/MCL (ref 0.6–4.6)
LYMPHOCYTES NFR BLD AUTO: 10.1 %
LYMPHOCYTES NFR BLD AUTO: 11.3 %
LYMPHOCYTES NFR BLD AUTO: 11.4 %
MCH RBC QN AUTO: 27.6 PG (ref 27–31)
MCH RBC QN AUTO: 27.9 PG (ref 27–31)
MCH RBC QN AUTO: 28.6 PG (ref 27–31)
MCHC RBC AUTO-ENTMCNC: 33.6 G/DL (ref 33–36)
MCHC RBC AUTO-ENTMCNC: 33.6 G/DL (ref 33–36)
MCHC RBC AUTO-ENTMCNC: 33.9 G/DL (ref 33–36)
MCV RBC AUTO: 82.1 FL (ref 80–94)
MCV RBC AUTO: 83.2 FL (ref 80–94)
MCV RBC AUTO: 84.3 FL (ref 80–94)
MONOCYTES # BLD AUTO: 0.49 X10(3)/MCL (ref 0.1–1.3)
MONOCYTES # BLD AUTO: 0.5 X10(3)/MCL (ref 0.1–1.3)
MONOCYTES # BLD AUTO: 0.58 X10(3)/MCL (ref 0.1–1.3)
MONOCYTES NFR BLD AUTO: 10.1 %
MONOCYTES NFR BLD AUTO: 8.9 %
MONOCYTES NFR BLD AUTO: 9.1 %
NEUTROPHILS # BLD AUTO: 4.09 X10(3)/MCL (ref 2.1–9.2)
NEUTROPHILS # BLD AUTO: 4.29 X10(3)/MCL (ref 2.1–9.2)
NEUTROPHILS # BLD AUTO: 4.44 X10(3)/MCL (ref 2.1–9.2)
NEUTROPHILS NFR BLD AUTO: 75.5 %
NEUTROPHILS NFR BLD AUTO: 76.3 %
NEUTROPHILS NFR BLD AUTO: 77.2 %
NRBC BLD AUTO-RTO: 0 %
NRBC BLD AUTO-RTO: 0.4 %
NRBC BLD AUTO-RTO: 0.5 %
PHOSPHATE SERPL-MCNC: 2.5 MG/DL (ref 2.3–4.7)
PLATELET # BLD AUTO: 69 X10(3)/MCL (ref 130–400)
PLATELET # BLD AUTO: 73 X10(3)/MCL (ref 130–400)
PLATELET # BLD AUTO: 79 X10(3)/MCL (ref 130–400)
PLATELETS.RETICULATED NFR BLD AUTO: 4.2 % (ref 0.9–11.2)
PMV BLD AUTO: 9.2 FL (ref 7.4–10.4)
PMV BLD AUTO: 9.8 FL (ref 7.4–10.4)
PMV BLD AUTO: 9.9 FL (ref 7.4–10.4)
POC IONIZED CALCIUM: 0.92 MMOL/L (ref 1.06–1.42)
POC IONIZED CALCIUM: 1.21 MMOL/L (ref 1.06–1.42)
POC TCO2 (MEASURED): 24 MMOL/L (ref 23–29)
POC TCO2 (MEASURED): 25 MMOL/L (ref 23–29)
POTASSIUM BLD-SCNC: 3.7 MMOL/L (ref 3.5–5.1)
POTASSIUM BLD-SCNC: 3.8 MMOL/L (ref 3.5–5.1)
POTASSIUM SERPL-SCNC: 3.2 MMOL/L (ref 3.5–5.1)
PROT SERPL-MCNC: 3.8 GM/DL (ref 6.4–8.3)
RBC # BLD AUTO: 3.15 X10(6)/MCL (ref 4.7–6.1)
RBC # BLD AUTO: 3.19 X10(6)/MCL (ref 4.7–6.1)
RBC # BLD AUTO: 3.32 X10(6)/MCL (ref 4.7–6.1)
SAMPLE: ABNORMAL
SAMPLE: ABNORMAL
SODIUM BLD-SCNC: 140 MMOL/L (ref 136–145)
SODIUM BLD-SCNC: 141 MMOL/L (ref 136–145)
SODIUM SERPL-SCNC: 143 MMOL/L (ref 136–145)
WBC # BLD AUTO: 5.41 X10(3)/MCL (ref 4.5–11.5)
WBC # BLD AUTO: 5.62 X10(3)/MCL (ref 4.5–11.5)
WBC # BLD AUTO: 5.75 X10(3)/MCL (ref 4.5–11.5)

## 2024-09-04 PROCEDURE — 63600175 PHARM REV CODE 636 W HCPCS: Performed by: INTERNAL MEDICINE

## 2024-09-04 PROCEDURE — 30233M1 TRANSFUSION OF NONAUTOLOGOUS PLASMA CRYOPRECIPITATE INTO PERIPHERAL VEIN, PERCUTANEOUS APPROACH: ICD-10-PCS | Performed by: INTERNAL MEDICINE

## 2024-09-04 PROCEDURE — 63600175 PHARM REV CODE 636 W HCPCS

## 2024-09-04 PROCEDURE — 99900035 HC TECH TIME PER 15 MIN (STAT)

## 2024-09-04 PROCEDURE — 99900031 HC PATIENT EDUCATION (STAT)

## 2024-09-04 PROCEDURE — 25000003 PHARM REV CODE 250: Performed by: INTERNAL MEDICINE

## 2024-09-04 PROCEDURE — 30233R1 TRANSFUSION OF NONAUTOLOGOUS PLATELETS INTO PERIPHERAL VEIN, PERCUTANEOUS APPROACH: ICD-10-PCS | Performed by: INTERNAL MEDICINE

## 2024-09-04 PROCEDURE — 94003 VENT MGMT INPAT SUBQ DAY: CPT

## 2024-09-04 PROCEDURE — 20000000 HC ICU ROOM

## 2024-09-04 PROCEDURE — 36415 COLL VENOUS BLD VENIPUNCTURE: CPT | Performed by: INTERNAL MEDICINE

## 2024-09-04 PROCEDURE — 99232 SBSQ HOSP IP/OBS MODERATE 35: CPT | Mod: ,,, | Performed by: SURGERY

## 2024-09-04 PROCEDURE — 27200966 HC CLOSED SUCTION SYSTEM

## 2024-09-04 PROCEDURE — 27100171 HC OXYGEN HIGH FLOW UP TO 24 HOURS

## 2024-09-04 PROCEDURE — 25000003 PHARM REV CODE 250

## 2024-09-04 PROCEDURE — 85025 COMPLETE CBC W/AUTO DIFF WBC: CPT | Performed by: INTERNAL MEDICINE

## 2024-09-04 PROCEDURE — 83605 ASSAY OF LACTIC ACID: CPT

## 2024-09-04 PROCEDURE — 99900026 HC AIRWAY MAINTENANCE (STAT)

## 2024-09-04 PROCEDURE — 84100 ASSAY OF PHOSPHORUS: CPT

## 2024-09-04 PROCEDURE — 87338 HPYLORI STOOL AG IA: CPT | Performed by: INTERNAL MEDICINE

## 2024-09-04 PROCEDURE — 94760 N-INVAS EAR/PLS OXIMETRY 1: CPT

## 2024-09-04 PROCEDURE — 80053 COMPREHEN METABOLIC PANEL: CPT

## 2024-09-04 RX ORDER — PANTOPRAZOLE SODIUM 40 MG/10ML
40 INJECTION, POWDER, LYOPHILIZED, FOR SOLUTION INTRAVENOUS 2 TIMES DAILY
Status: DISCONTINUED | OUTPATIENT
Start: 2024-09-04 | End: 2024-09-06

## 2024-09-04 RX ADMIN — PANTOPRAZOLE SODIUM 40 MG: 40 INJECTION, POWDER, LYOPHILIZED, FOR SOLUTION INTRAVENOUS at 12:09

## 2024-09-04 RX ADMIN — PANTOPRAZOLE SODIUM 40 MG: 40 INJECTION, POWDER, LYOPHILIZED, FOR SOLUTION INTRAVENOUS at 08:09

## 2024-09-04 RX ADMIN — SODIUM CHLORIDE 8 MG/HR: 900 INJECTION INTRAVENOUS at 03:09

## 2024-09-04 RX ADMIN — MUPIROCIN: 20 OINTMENT TOPICAL at 08:09

## 2024-09-04 RX ADMIN — PROPOFOL 50 MCG/KG/MIN: 10 INJECTION, EMULSION INTRAVENOUS at 03:09

## 2024-09-04 RX ADMIN — PROPOFOL 40 MCG/KG/MIN: 10 INJECTION, EMULSION INTRAVENOUS at 06:09

## 2024-09-04 RX ADMIN — POTASSIUM PHOSPHATE, MONOBASIC AND POTASSIUM PHOSPHATE, DIBASIC 30 MMOL: 224; 236 INJECTION, SOLUTION, CONCENTRATE INTRAVENOUS at 09:09

## 2024-09-04 RX ADMIN — POTASSIUM PHOSPHATE, MONOBASIC AND POTASSIUM PHOSPHATE, DIBASIC 30 MMOL: 224; 236 INJECTION, SOLUTION, CONCENTRATE INTRAVENOUS at 10:09

## 2024-09-04 RX ADMIN — MUPIROCIN: 20 OINTMENT TOPICAL at 09:09

## 2024-09-04 RX ADMIN — SODIUM CHLORIDE 8 MG/HR: 900 INJECTION INTRAVENOUS at 09:09

## 2024-09-04 NOTE — ANESTHESIA POSTPROCEDURE EVALUATION
Anesthesia Post Evaluation    Patient: Tony Zarate    Procedure(s) Performed: * No procedures listed *    Final Anesthesia Type: general      Patient location during evaluation: PACU  Patient participation: Yes- Able to Participate  Level of consciousness: awake and alert  Post-procedure vital signs: reviewed and stable  Pain management: adequate  Airway patency: patent  RAMÍREZ mitigation strategies: Multimodal analgesia  PONV status at discharge: No PONV  Anesthetic complications: no      Cardiovascular status: hemodynamically stable  Respiratory status: unassisted  Hydration status: euvolemic  Follow-up not needed.              Vitals Value Taken Time   /60 09/04/24 0601   Temp 37.1 °C (98.8 °F) 09/04/24 0400   Pulse 63 09/04/24 0631   Resp 21 09/04/24 0631   SpO2 100 % 09/04/24 0631   Vitals shown include unfiled device data.      No case tracking events are documented in the log.      Pain/Jimbo Score: No data recorded

## 2024-09-04 NOTE — PROGRESS NOTES
"   Acute Care Surgery   Progress Note  Admit Date: 9/1/2024  HD#3  POD#1 Day Post-Op    Subjective:   Interval history:  S/p bedside EGD w/ GI 9/2, 9/3, s/p IR embolization 9/3  Intubated - stable vent settings, sedated on propofol  Off pressors  No active bleeding     Home Meds:  Current Outpatient Medications   Medication Instructions    ondansetron (ZOFRAN) 4 mg, Oral, Every 6 hours    tamsulosin (FLOMAX) 0.4 mg, Oral, Daily      Scheduled Meds:   mupirocin   Nasal BID    potassium phosphate IVPB  30 mmol Intravenous Once     Continuous Infusions:   lactated ringers   Intravenous Continuous   Stopped at 09/02/24 1116    NORepinephrine bitartrate-D5W  0-3 mcg/kg/min Intravenous Continuous   Stopped at 09/03/24 1605    pantoprazole (PROTONIX) IV infusion  8 mg/hr Intravenous Continuous 20 mL/hr at 09/04/24 0912 8 mg/hr at 09/04/24 0912    propofoL  0-50 mcg/kg/min Intravenous Continuous   Stopped at 09/04/24 0907     PRN Meds:  Current Facility-Administered Medications:     0.9%  NaCl infusion (for blood administration), , Intravenous, Q24H PRN    0.9%  NaCl infusion (for blood administration), , Intravenous, Q24H PRN    0.9%  NaCl infusion (for blood administration), , Intravenous, Q24H PRN    0.9%  NaCl infusion (for blood administration), , Intravenous, Q24H PRN    fentaNYL, 50 mcg, Intravenous, Q2H PRN    melatonin, 6 mg, Oral, Nightly PRN    ondansetron, 4 mg, Intravenous, Q6H PRN    ondansetron, 4 mg, Intravenous, Q8H PRN    sodium chloride 0.9%, 10 mL, Intravenous, PRN     Objective:     VITAL SIGNS: 24 HR MIN & MAX LAST   Temp  Min: 98.2 °F (36.8 °C)  Max: 99.1 °F (37.3 °C)  99 °F (37.2 °C)   BP  Min: 95/53  Max: 186/136  105/60    Pulse  Min: 61  Max: 123  62    Resp  Min: 20  Max: 25  (!) 25    SpO2  Min: 71 %  Max: 100 %  100 %      HT: 5' 7" (170.2 cm)  WT: 74.8 kg (165 lb)  BMI: 25.8     Intake/output:  Intake/Output - Last 3 Shifts         09/02 0700 09/03 0659 09/03 0700 09/04 0659 09/04 " 0700  09/05 0659    P.O. 0      I.V. (mL/kg) 1622 (21.7) 1281.7 (17.1) 135.2 (1.8)    Blood 86889 64458.4     IV Piggyback 1000 701     Total Intake(mL/kg) 06707 (177.2) 53826.1 (224.2) 135.2 (1.8)    Urine (mL/kg/hr) 1525 (0.8) 2150 (1.2)     Stool 0 0     Total Output 1525 2150     Net +87997 +99628.1 +135.2           Urine Occurrence 3 x      Stool Occurrence 7 x 4 x             Intake/Output Summary (Last 24 hours) at 9/4/2024 1113  Last data filed at 9/4/2024 0910  Gross per 24 hour   Intake 18672.65 ml   Output 2000 ml   Net 94672.65 ml           Lines/drains/airway:       Peripheral IV - Single Lumen 09/02/24 0145 18 G Anterior;Right Forearm (Active)   Site Assessment Clean;Dry;Intact 09/03/24 0400   Extremity Assessment Distal to IV No abnormal discoloration 09/03/24 0400   Line Status Infusing 09/03/24 0400   Dressing Status Clean;Dry;Intact 09/03/24 0400   Dressing Intervention First dressing 09/03/24 0400   Number of days: 1            Peripheral IV - Single Lumen 09/02/24 0400 20 G Anterior;Left Upper Arm (Active)   Site Assessment Clean;Dry;Intact 09/03/24 0400   Extremity Assessment Distal to IV No abnormal discoloration 09/03/24 0400   Line Status Infusing 09/03/24 0400   Dressing Status Clean;Dry;Intact 09/03/24 0400   Dressing Intervention First dressing 09/03/24 0400   Number of days: 1            Peripheral IV - Single Lumen 09/02/24 1300 20 G Anterior;Left Upper Arm (Active)   Site Assessment Clean;Dry;Intact 09/03/24 0400   Extremity Assessment Distal to IV No abnormal discoloration 09/03/24 0400   Line Status Flushed;Saline locked 09/03/24 0400   Dressing Status Clean;Dry;Intact 09/03/24 0400   Dressing Intervention Integrity maintained 09/03/24 0400   Number of days: 0            Urethral Catheter 09/02/24 1600 (Active)   $ Lewis Insertion Bedside Insertion Performed 09/02/24 1600   Site Assessment Clean;Intact;Dry 09/03/24 0400   Collection Container Urimeter 09/03/24 0400   Securement Method  "secured to top of thigh w/ adhesive device 09/03/24 0400   Catheter Care Performed yes 09/03/24 0400   Reason for Continuing Urinary Catheterization Urinary retention 09/03/24 0400   CAUTI Prevention Bundle Securement Device in place with 1" slack;Intact seal between catheter & drainage tubing;Drainage bag/urimeter off the floor;Sheeting clip in use;No dependent loops or kinks;Drainage bag/urimeter not overfilled (<2/3 full);Drainage bag/urimeter below bladder 09/02/24 2000   Output (mL) 100 mL 09/03/24 0600   Number of days: 0       Physical examination:  General: intubated and sedated  CV: RR  Resp: intubated and mechanically ventilated  GI:  Abdomen soft, mildly distended  :  Deferred  MSK: No muscle atrophy, cyanosis, peripheral edema  Skin/wounds:  No rashes, ulcers, erythema  Neuro: sedated    Labs:  Renal:  Recent Labs     09/01/24 1741 09/02/24 0730 09/03/24 0454 09/04/24  0342   BUN 35.9* 31.3* 28.5* 17.1   CREATININE 0.86 0.75 0.76 0.76     No results for input(s): "LACTIC" in the last 72 hours.  FENGI:  Recent Labs     09/01/24 1741 09/02/24 0730 09/03/24  0454 09/04/24  0342    140 140 143   K 3.7 4.2 4.0 3.2*   * 109* 112* 113*   CO2 22 22 22 26   CALCIUM 8.9 8.1* 7.1* 7.5*   PHOS  --   --   --  2.5   ALBUMIN 3.6 2.9* 2.2* 2.4*   BILITOT 0.4 0.5 0.3 0.4   AST 14 12 12 16   ALKPHOS 48 37* 34* 39*   ALT 15 11 8 12     Heme:  Recent Labs     09/01/24  1741 09/01/24  2239 09/03/24  0454 09/03/24  0910 09/03/24  1355 09/03/24  1451 09/03/24  1611 09/03/24  2204 09/04/24  0342   HGB 9.6*   < > 8.3* 8.4*  --   --  9.2* 9.0* 8.8*   HCT 29.2*   < > 24.6* 24.1*   < > 21* 27.0* 25.8* 26.2*      < > 93* 92*  --   --  92* 82* 73*   INR 1.1  --  1.2  --   --   --   --   --   --     < > = values in this interval not displayed.     ID:  Recent Labs     09/03/24  0910 09/03/24  1611 09/03/24  2204 09/04/24  0342   WBC 14.86* 6.57 5.93 5.41     CBG:  Recent Labs     09/01/24  1741 09/02/24  0730 " "09/03/24  0454 09/04/24  0342   GLUCOSE 132* 122* 129* 103*      Cardiovascular:  No results for input(s): "TROPONINI", "CKTOTAL", "CKMB", "BNP" in the last 168 hours.  ABG:  Recent Labs   Lab 09/02/24  1617   PH 7.430   PO2 270.0*   PCO2 41.0   HCO3 27.2*      I have reviewed all pertinent lab results within the past 24 hours.    Imaging:  IR Embolization   Final Result      Technically successful Coli Embolization of the duodenal bleed as visualized on angiography.         Electronically signed by: Will Costa   Date:    09/03/2024   Time:    15:48      X-Ray Chest 1 View   Final Result      Interval extubation with placement of a right-sided central line.  No acute findings otherwise.         Electronically signed by: Yinka Hendrickson   Date:    09/03/2024   Time:    11:09      X-Ray Chest 1 View   Final Result      No acute cardiopulmonary abnormality.         Electronically signed by: Jayla Santiago   Date:    09/03/2024   Time:    14:25      X-Ray Chest 1 View   Final Result      Endotracheal tube tip 3.5 cm above the samir.         Electronically signed by: Eyad Torres   Date:    09/02/2024   Time:    13:30         I have reviewed all pertinent imaging results/findings within the past 24 hours.    Micro/Path/Other:  Microbiology Results (last 7 days)       ** No results found for the last 168 hours. **           Pathology Results  (Last 7 days)      None           Assessment & Plan:   55 yo male with no significant PMHx who presented from OSH due to hematochezia and syncopal episode found to have bleeding ulcer in duodenal bulb on EGD, s/p IR embolization 9/3    - no acute surgical intervention at this time  - serial CBCs, transfuse for Hgb <7  - continue PPI  - f/u H.pylori  - remainder of care per primary  - surgery will sign off at this time, please call with any questions/concerns    Dayana Alston MD  [employer] General Surgery PGY2  "

## 2024-09-04 NOTE — PROGRESS NOTES
[facility]  Pulmonary Critical Care Note    Patient Name: Tony Zarate  MRN: [id number]  Admission Date: 9/1/2024  Hospital Length of Stay: 3 days  Code Status: Full Code  Attending Provider: Natasha Garza MD  Primary Care Provider: Mirlande Zarate NP     Subjective:     HPI:   Patient is a 54-year-old male with no significant past medical history who presented to an outside ER with a complaint of bloody bowel movements multi multiple times over the last 1 day with an associated syncopal episode.  At the prior facility, workup showed a drop in his hemoglobin from 16 to 9 and patient was transferred to [facility] for GI services.  CT abdomen displayed no acute pathology.  On arrival to our facility, patient had an episode of hematemesis (approximately 15 cc according to nursing staff) and another syncopal episode.  Patient states that he has been noticing multiple episodes of black/tarry stools over the last day.  He denies fever, chills, other prior episodes of hematemesis, abdominal pain, history of GERD, hematochezia.  Patient denies risk factors such as NSAID use, alcohol use, and tobacco abuse.  Patient denies history of previous GI bleed or GI pathology in the past.  Patient states that he has never underwent EGD or colonoscopy in the past.      Patient was evaluated by GI who recommended ICU admission.  Patient was started on IV PPI infusion and transfused 3 units pRBC.      09/02/2024- EGD  09/03/2024- IR embolization      24 Hour Interval History  Underwent embolization of multiple pancreatic duodenal branches by IR yesterday.  Nursing reported no further GI bleeding overnight.  H&H is stable, 8.8 and 26.2.  Potassium this morning 3.2. Off vasopressor support.         Past Surgical History:   Procedure Laterality Date    EGD, WITH HEMORRHAGE CONTROL  9/2/2024    Procedure: EGD,WITH HEMORRHAGE CONTROL;  Surgeon: Jason Napier MD;  Location: [facility] ENDOSCOPY;  Service:  Gastroenterology;;  5cc epi    ESOPHAGOGASTRODUODENOSCOPY N/A 9/2/2024    Procedure: EGD (ESOPHAGOGASTRODUODENOSCOPY);  Surgeon: Jason Napier MD;  Location: [facility] ENDOSCOPY;  Service: Gastroenterology;  Laterality: N/A;    ESOPHAGOGASTRODUODENOSCOPY N/A 9/3/2024    Procedure: EGD (ESOPHAGOGASTRODUODENOSCOPY);  Surgeon: Jason Napier MD;  Location: [facility] ENDOSCOPY;  Service: Gastroenterology;  Laterality: N/A;  09/03/24: Anesthesia needed       Social History     Socioeconomic History    Marital status:      Social Determinants of Health     Financial Resource Strain: Patient Unable To Answer (9/3/2024)    Overall Financial Resource Strain (CARDIA)     Difficulty of Paying Living Expenses: Patient unable to answer   Food Insecurity: Patient Unable To Answer (9/3/2024)    Hunger Vital Sign     Worried About Running Out of Food in the Last Year: Patient unable to answer     Ran Out of Food in the Last Year: Patient unable to answer   Transportation Needs: Patient Unable To Answer (9/3/2024)    TRANSPORTATION NEEDS     Transportation : Patient unable to answer   Stress: Patient Unable To Answer (9/3/2024)    [employer] - Occupational Stress Questionnaire     Feeling of Stress : Patient unable to answer   Housing Stability: Patient Unable To Answer (9/3/2024)    Housing Stability Vital Sign     Unable to Pay for Housing in the Last Year: Patient unable to answer     Homeless in the Last Year: Patient unable to answer     Current Outpatient Medications   Medication Instructions    ondansetron (ZOFRAN) 4 mg, Oral, Every 6 hours    tamsulosin (FLOMAX) 0.4 mg, Oral, Daily       Current Inpatient Medications   mupirocin   Nasal BID    potassium phosphate IVPB  30 mmol Intravenous Once       Current Intravenous Infusions   lactated ringers   Intravenous Continuous   Stopped at 09/02/24 1116    NORepinephrine bitartrate-D5W  0-3 mcg/kg/min Intravenous Continuous   Stopped at  09/03/24 1605    pantoprazole (PROTONIX) IV infusion  8 mg/hr Intravenous Continuous 20 mL/hr at 09/04/24 0532 8 mg/hr at 09/04/24 0532    propofoL  0-50 mcg/kg/min Intravenous Continuous 18 mL/hr at 09/04/24 0656 40 mcg/kg/min at 09/04/24 0656         Review of Systems   Unable to perform ROS: Intubated          Objective:       Intake/Output Summary (Last 24 hours) at 9/4/2024 0850  Last data filed at 9/4/2024 0600  Gross per 24 hour   Intake 77423.89 ml   Output 2000 ml   Net 24870.89 ml         Vital Signs (Most Recent):  Temp: 98.8 °F (37.1 °C) (09/04/24 0400)  Pulse: 65 (09/04/24 0615)  Resp: (!) 22 (09/04/24 0615)  BP: 105/60 (09/04/24 0600)  SpO2: 100 % (09/04/24 0615)  Body mass index is 25.84 kg/m².  Weight: 74.8 kg (165 lb) Vital Signs (24h Range):  Temp:  [98.2 °F (36.8 °C)-99.1 °F (37.3 °C)] 98.8 °F (37.1 °C)  Pulse:  [] 65  Resp:  [14-25] 22  SpO2:  [71 %-100 %] 100 %  BP: ()/() 105/60     Physical Exam  Vitals reviewed.   Constitutional:       Interventions: He is intubated.      Comments: Sedated, intubated   Eyes:      Comments: Conjunctival pallor   Cardiovascular:      Rate and Rhythm: Normal rate and regular rhythm.      Heart sounds: No murmur heard.  Pulmonary:      Effort: Pulmonary effort is normal. He is intubated.      Breath sounds: Normal breath sounds.   Abdominal:      General: There is no distension.      Palpations: Abdomen is soft.   Musculoskeletal:      Right lower leg: No edema.      Left lower leg: No edema.   Skin:     Capillary Refill: Capillary refill takes 2 to 3 seconds.         Lines/Drains/Airways       Central Venous Catheter Line  Duration             Trialysis (Dialysis) Catheter 09/03/24 1544 right internal jugular <1 day              Drain  Duration                  Urethral Catheter 09/02/24 1600 1 day              Airway  Duration                  Airway - Non-Surgical 09/03/24 1125 Endotracheal Tube <1 day              Peripheral Intravenous Line   Duration                  Peripheral IV - Single Lumen 09/02/24 0145 18 G Anterior;Right Forearm 2 days         Peripheral IV - Single Lumen 09/02/24 0400 20 G Anterior;Left Upper Arm 2 days         Peripheral IV - Single Lumen 09/02/24 1300 20 G Anterior;Left Upper Arm 1 day                    Significant Labs:    Lab Results   Component Value Date    WBC 5.41 09/04/2024    HGB 8.8 (L) 09/04/2024    HCT 26.2 (L) 09/04/2024    MCV 82.1 09/04/2024    PLT 73 (L) 09/04/2024           BMP  Lab Results   Component Value Date     09/04/2024    K 3.2 (L) 09/04/2024    CO2 26 09/04/2024    BUN 17.1 09/04/2024    CREATININE 0.76 09/04/2024    CALCIUM 7.5 (L) 09/04/2024    AGAP 4.0 09/04/2024    EGFRNONAA >60 10/03/2017       ABG  Recent Labs   Lab 09/02/24  1617   PH 7.430   PO2 270.0*   PCO2 41.0   HCO3 27.2*   POCBASEDEF 2.60       Mechanical Ventilation Support:  Vent Mode: A/C (09/04/24 0542)  Ventilator Initiated: Yes (09/03/24 1120)  Set Rate: 20 BPM (09/04/24 0542)  Vt Set: 450 mL (09/04/24 0542)  PEEP/CPAP: 8 cmH20 (09/04/24 0542)  Oxygen Concentration (%): 40 (09/04/24 0542)  Peak Airway Pressure: 17 cmH20 (09/04/24 0542)  Total Ve: 16 L/m (09/04/24 0542)  F/VT Ratio<105 (RSBI): (!) 42.42 (09/04/24 0337)  Significant Imaging:  I have reviewed the pertinent imaging within the past 24 hours.    Assessment/Plan:     Assessment  Acute upper GI bleed - bleeding ulcer in duodenal bulb s/p embolization  Acute blood loss anemia secondary to above  Syncope    Plan  We will begin to wean mechanical ventilatory support for possible extubation this morning.  Wean sedation  Now off vasopressor support, continue to monitor BP   Continue to monitor for bleeding, next H&H is pending  Potassium will be replaced   Continue Protonix drip  Continue all ongoing ICU care        DVT: SCD  GI Prophylaxis:  Protonix     32 minutes of critical care was time spent personally by me on the following activities: development of treatment  plan with patient or surrogate and bedside caregivers, discussions with consultants, evaluation of patient's response to treatment, examination of patient, ordering and performing treatments and interventions, ordering and review of laboratory studies, ordering and review of radiographic studies, pulse oximetry, re-evaluation of patient's condition.  This critical care time did not overlap with that of any other provider or involve time for any procedures.     GRAEME Hopkins  Pulmonary Critical Care Medicine  [facility]  Oncology Acute  DOS: 09/04/2024

## 2024-09-04 NOTE — PROGRESS NOTES
53yo M with duodenal hemorrhage s/p Coil embolization of multiple pancreaticoduodenal branches yesterday. Pt seen and examined, remained intubated, resting comfortably.  Nursing staff reports no further signs of GI blood loss overnight with pt following commands. Pt VSS off vasopressors. Review of chart shows stability of H/H since procedure.     PE:  Intubated resting comfortably  +S1, S2  No difficulty ventilating  ABD: soft  Right groin soft with no signs of vascular injury  Right DP pulse +2  No CCE    A&P: 53 yo M s/p coil embolization of duodenal hemorrhage yesterday.  -Currently no signs of continued bleeding with stabilization  -Monitor for signs of bowel ischemia  -Cont PPI  -Reconsult as needed  -Will follow at a distance

## 2024-09-04 NOTE — PLAN OF CARE
09/04/24 1235   Discharge Assessment   Assessment Type Discharge Planning Assessment   Confirmed/corrected address, phone number and insurance Yes   Confirmed Demographics Correct on Facesheet   Source of Information patient;family   If unable to respond/provide information was family/caregiver contacted? Yes  (at bedside)   Contact Name/Number Elliot,anthony (Spouse)  [phone]   When was your last doctors appointment?   (about a yr ago)   Communicated SAMARA with patient/caregiver Date not available/Unable to determine   Reason For Admission GIB   People in Home spouse;grandchild(tayler)   Do you expect to return to your current living situation? Yes   Do you have help at home or someone to help you manage your care at home? Yes   Who are your caregiver(s) and their phone number(s)? anthony Zarate (Spouse)  [phone]   Prior to hospitilization cognitive status: Alert/Oriented   Current cognitive status: Alert/Oriented   Walking or Climbing Stairs Difficulty no   Dressing/Bathing Difficulty no   Equipment Currently Used at Home blood pressure machine   Readmission within 30 days? No   Patient currently being followed by outpatient case management? No   Do you currently have service(s) that help you manage your care at home? No   Do you take prescription medications? Yes   Do you have prescription coverage? Yes   Coverage umr   Do you have any problems affording any of your prescribed medications? No   Is the patient taking medications as prescribed? yes   Who is going to help you get home at discharge? anthony Zarate (Spouse)  [phone]   How do you get to doctors appointments? car, drives self   Are you on dialysis? No   Do you take coumadin? No   Discharge Plan A Home with family   Discharge Plan B Home with family   DME Needed Upon Discharge  none   Discharge Plan discussed with: Patient;Spouse/sig other   Name(s) and Number(s) Elliot,anthony (Spouse)  [phone]   Transition of Care Barriers None   Physical  Activity   On average, how many days per week do you engage in moderate to strenuous exercise (like a brisk walk)? 0 days   On average, how many minutes do you engage in exercise at this level? 0 min   Financial Resource Strain   How hard is it for you to pay for the very basics like food, housing, medical care, and heating? Not hard   Housing Stability   In the last 12 months, was there a time when you were not able to pay the mortgage or rent on time? N   At any time in the past 12 months, were you homeless or living in a shelter (including now)? N   Transportation Needs   Has the lack of transportation kept you from medical appointments, meetings, work or from getting things needed for daily living? No   Food Insecurity   Within the past 12 months, you worried that your food would run out before you got the money to buy more. Never true   Within the past 12 months, the food you bought just didn't last and you didn't have money to get more. Never true   Stress   Do you feel stress - tense, restless, nervous, or anxious, or unable to sleep at night because your mind is troubled all the time - these days? Not at all   Social Isolation   How often do you feel lonely or isolated from those around you?  Never   Alcohol Use   Q1: How often do you have a drink containing alcohol? Never   [employer]   In the past 12 months has the electric, gas, oil, or water company threatened to shut off services in your home? No   Health Literacy   How often do you need to have someone help you when you read instructions, pamphlets, or other written material from your doctor or pharmacy? Never   OTHER   Name(s) of People in Home anthony Zarate (Spouse)  [phone]

## 2024-09-04 NOTE — PROGRESS NOTES
Tony Zarate   MRN: [id number]   ADMISSION DATE: 2024  : 1970  AGE: 54 y.o.    DATE :  2024       PROVIDER: ROSALINDA SHELLEY      SUBJECTIVE:  Patient seems to be doing well.  Extubated.  No overt GI bleed  Review of Systems   Constitutional:  Negative for chills and fever.   HENT:  Negative for congestion and nosebleeds.    Eyes:  Negative for redness.   Respiratory:  Negative for cough, chest tightness and shortness of breath.    Cardiovascular:  Negative for chest pain, palpitations and leg swelling.   Gastrointestinal:  Positive for blood in stool (Significant improvement of the GI bleed.  No dez bleeding reported). Negative for abdominal distention, abdominal pain, constipation, diarrhea, nausea and vomiting.   Endocrine: Negative for cold intolerance and polydipsia.   Genitourinary:  Negative for dysuria and flank pain.   Musculoskeletal:  Negative for arthralgias.   Skin:  Negative for pallor.   Allergic/Immunologic: Negative for food allergies.   Neurological:  Negative for dizziness and headaches.   Hematological:  Does not bruise/bleed easily.   Psychiatric/Behavioral:  Negative for agitation and confusion.         Review of patient's allergies indicates:  No Known Allergies      mupirocin   Nasal BID    pantoprazole  40 mg Intravenous BID    potassium phosphate IVPB  30 mmol Intravenous Once       Medications Discontinued During This Encounter   Medication Reason    0.9%  NaCl infusion (for blood administration)     0.9%  NaCl infusion (for blood administration)     0.9%  NaCl infusion (for blood administration)     0.9%  NaCl infusion (for blood administration)     0.9%  NaCl infusion (for blood administration)     0.9%  NaCl infusion (for blood administration)     0.9%  NaCl infusion (for blood administration)     0.9%  NaCl infusion (for blood administration)     0.9%  NaCl infusion (for blood administration)     0.9%  NaCl infusion (for blood administration)     0.9%  NaCl infusion (for  blood administration)     0.9%  NaCl infusion (for blood administration)     0.9%  NaCl infusion (for blood administration)     0.9%  NaCl infusion (for blood administration)     0.9%  NaCl infusion (for blood administration)     0.9%  NaCl infusion (for blood administration)     EPINEPHrine (ADRENALIN) 0.1 mg/mL injection Returned to [facility]    pantoprazole (PROTONIX) 40 mg in 0.9% NaCl 100 mL IVPB (MB+)          lactated ringers   Intravenous Continuous   Stopped at 09/02/24 1116    NORepinephrine bitartrate-D5W  0-3 mcg/kg/min Intravenous Continuous   Stopped at 09/03/24 1605    propofoL  0-50 mcg/kg/min Intravenous Continuous   Stopped at 09/04/24 0907        History reviewed. No pertinent past medical history.   Social History     Socioeconomic History    Marital status:      Social Determinants of Health     Financial Resource Strain: Low Risk  (9/4/2024)    Overall Financial Resource Strain (CARDIA)     Difficulty of Paying Living Expenses: Not hard at all   Food Insecurity: No Food Insecurity (9/4/2024)    Hunger Vital Sign     Worried About Running Out of Food in the Last Year: Never true     Ran Out of Food in the Last Year: Never true   Transportation Needs: No Transportation Needs (9/4/2024)    TRANSPORTATION NEEDS     Transportation : No   Physical Activity: Inactive (9/4/2024)    Exercise Vital Sign     Days of Exercise per Week: 0 days     Minutes of Exercise per Session: 0 min   Stress: No Stress Concern Present (9/4/2024)    [employer] - Occupational Stress Questionnaire     Feeling of Stress : Not at all   Housing Stability: Low Risk  (9/4/2024)    Housing Stability Vital Sign     Unable to Pay for Housing in the Last Year: No     Homeless in the Last Year: No      Past Surgical History:   Procedure Laterality Date    EGD, WITH HEMORRHAGE CONTROL  9/2/2024    Procedure: EGD,WITH HEMORRHAGE CONTROL;  Surgeon: Jason Napier MD;  Location: [facility] ENDOSCOPY;   Service: Gastroenterology;;  5cc epi    ESOPHAGOGASTRODUODENOSCOPY N/A 9/2/2024    Procedure: EGD (ESOPHAGOGASTRODUODENOSCOPY);  Surgeon: Jason Napier MD;  Location: [facility] ENDOSCOPY;  Service: Gastroenterology;  Laterality: N/A;    ESOPHAGOGASTRODUODENOSCOPY N/A 9/3/2024    Procedure: EGD (ESOPHAGOGASTRODUODENOSCOPY);  Surgeon: Jason Napier MD;  Location: [facility] ENDOSCOPY;  Service: Gastroenterology;  Laterality: N/A;  09/03/24: Anesthesia needed        OBJECTIVE:     Vitals:    09/04/24 1000 09/04/24 1100 09/04/24 1200 09/04/24 1300   BP: 113/62 116/65 118/61 116/62   BP Location:       Patient Position:       Pulse: 78 81 75 77   Resp: 16 15 15 19   Temp:   98.9 °F (37.2 °C)    TempSrc:   Oral    SpO2: (!) 94% (!) 93% 96% (!) 94%   Weight:       Height:           Physical Exam  HENT:      Head: Normocephalic and atraumatic.      Nose: Nose normal.      Mouth/Throat:      Pharynx: Oropharynx is clear.   Eyes:      Extraocular Movements: Extraocular movements intact.      Conjunctiva/sclera: Conjunctivae normal.      Pupils: Pupils are equal, round, and reactive to light.   Cardiovascular:      Rate and Rhythm: Normal rate and regular rhythm.   Pulmonary:      Effort: Pulmonary effort is normal.      Breath sounds: Normal breath sounds.   Abdominal:      General: Bowel sounds are normal.      Palpations: Abdomen is soft.   Musculoskeletal:         General: Normal range of motion.      Cervical back: Neck supple.   Skin:     General: Skin is warm and dry.   Neurological:      Mental Status: He is alert and oriented to person, place, and time.   Psychiatric:         Mood and Affect: Mood normal.            LABS    Recent Labs   Lab 09/03/24  2204 09/04/24  0342 09/04/24  1238   WBC 5.93 5.41 5.62   HGB 9.0* 8.8* 9.5*   HCT 25.8* 26.2* 28.0*   PLT 82* 73* 79*      Recent Labs   Lab 09/02/24  0730 09/03/24  0454 09/04/24  0342    140 143   K 4.2 4.0 3.2*   * 112* 113*   CO2 22 22 26   BUN 31.3*  "28.5* 17.1   CREATININE 0.75 0.76 0.76   CALCIUM 8.1* 7.1* 7.5*   BILITOT 0.5 0.3 0.4   ALKPHOS 37* 34* 39*   ALT 11 8 12   AST 12 12 16   GLUCOSE 122* 129* 103*      Recent Labs   Lab 09/03/24  0454   INR 1.2    No results for input(s): "AMYLASE" in the last 168 hours.   Recent Labs   Lab 09/01/24  1741 09/02/24  0730 09/03/24  0454   APTT  --  24.0 29.2   INR 1.1  --  1.2           RESULTS: IR Embolization    Result Date: 9/3/2024  PROCEDURE: Mesenteric Angiography with GDA Branch Coil Embolization CLINICAL HISTORY: 54-year-old male with upper GI bleed and duodenal hemorrhage ANESTHESIA: Level of sedation: General Anesthesia Antibiotic prophylaxis: Ancef 2 g IV PHYSICIANS: Will Costa MD RADIATION DOSE: Fluoroscopy time: 23.3 minutes Radiation exposure dose: 709.95 mGy Exposure images: 478 CONTRAST: 150 cc of Isovue 370 PROCEDURAL SUMMARY: The procedure was deemed emergent for consent purposes.  The patient was placed supine on the angiography table. The skin overlying the right groin was then prepped and draped in the usual sterile fashion. The skin and soft tissues were anesthetized using 1% lidocaine.  The bony landmarks were confirmed under fluoroscopy.  Under real time ultrasound guidance, a 21g micropuncture needle was advanced into the common femoral artery. Return of blood flow was confirmed and a mandril wire was advanced into the needle under fluoroscopic guidance. The needle was removed and a microdilator was advanced over the wire into the artery.  The inner dilator and wire were removed and a Brickflowson guidewire was advanced through the sheath into the artery under fluoroscopic guidance.  The sheath was removed. A 5F vascular sheath was advanced into the common femoral artery and the inner dilator was removed.  The sheath was flushed. A 5F Durbin Cobra catheter was advance through the sheath over the wire into the arterial system.  The celiac and superior mesenteric artery were selected.  Angiography " demonstrated normal vascular anatomy.  The GDA was difficult to opacify from the celiac artery.  The glide Cobra catheter was exchanged for a Reed catheter over a Bentson guidewire. A decision was made to proceed with embolization. A coaxial system of a TruSelect microcatheter with a Fathom microwire was inserted through the select catheter. The microcatheter and microwire were used to select the gastroduodenal artery.  A superior branch of the gastroduodenal artery (superior pancreaticoduodenal) was selected and angiography was performed.  Angiography raise concern for active extravasation and vessel spasm.  Angiography confirmed positioning within the targeted vessel.  A 2 mm x 4 cm Embold coil was deployed within the suspected vessel.  Post embolization angiography confirms stasis within the target vessel.  The microcatheter was used to further investigate the gastroduodenal branches from the celiac.  A 2nd superior pancreaticoduodenal branch was selected.  Angiography demonstrated concern for distal pseudoaneurysm arising from this branch.  A 2 mm x 6 cm Embold coil was deployed in the second target vessel with post embolization angiography confirming stasis. Attention was then turned to the superior mesenteric artery.  The Reed catheter was removed over a Bentson guidewire and the glide Cobra catheter was reinserted.  This was used to select the superior mesenteric artery.  Angiography was performed within the SMA.  A definitive pseudoaneurysm was seen arising from a pancreaticoduodenal branch.  The microcatheter microwire were reinserted and used to select the target vessel.  A 2 mm x 4 cm Embold coil was deployed within the vessel.  After deployment, contrast extravasation was noted.  A second Embold coil was deployed within this target vessel measuring 2 mm x 6 cm.  Post embolization angiography confirms stasis through the targeted vessel. Once embolization was complete, angiography was performed  through the microcatheter confirming technically successful occlusion of the targeted branch.  The microcatheter was removed and angiography was repeated through parent catheter.  This confirmed successful embolization of the target vessel with no other vascular abnormality identified.  The catheter was then removed over a Anemoi Renovablesson guidewire.  Celiac angiography was repeated to ensure no further bleeding vessels are seen in the region of the duodenum.  All catheters were then removed over a wire. The groin sheath was flushed.  Angiography of the common femoral artery was performed via the vascular sheath.  Angiography of the common femoral artery demonstrated no evidence of injury. A Mynx closure device was deployed for hemostasis and the sheath was removed.  A sterile dressing was applied. The patient tolerated the procedure well and experienced no immediate complications. The patient was then brought to the recovery room in good condition.     Technically successful Coli Embolization of the duodenal bleed as visualized on angiography. Electronically signed by: Will Costa Date:    09/03/2024 Time:    15:48    X-Ray Chest 1 View    Result Date: 9/3/2024  EXAMINATION: XR CHEST 1 VIEW CLINICAL HISTORY: SOB; TECHNIQUE: Single frontal view of the chest was performed. COMPARISON: 09/02/2024 FINDINGS: LINES AND TUBES: Endotracheal tube projects over the trachea with tip above the asmir.  EKG/telemetry leads overlie the chest. MEDIASTINUM AND LORRIE: The cardiac silhouette is normal. LUNGS: No lobar consolidation. No edema. PLEURA:No pleural effusion. No pneumothorax. OTHER: No acute osseous abnormality.     No acute cardiopulmonary abnormality. Electronically signed by: Jayla Santiago Date:    09/03/2024 Time:    14:25    X-Ray Chest 1 View    Result Date: 9/3/2024  EXAMINATION: XR CHEST 1 VIEW CLINICAL HISTORY: acute lung ds; COMPARISON: Earlier today FINDINGS: Frontal view of the chest was obtained. Interval  extubation.  Right-sided central line tip overlies the distal SVC.  The heart is not enlarged.  Grossly clear lungs.  No pneumothorax.     Interval extubation with placement of a right-sided central line.  No acute findings otherwise. Electronically signed by: Yinka Hendrickson Date:    09/03/2024 Time:    11:09    X-Ray Chest 1 View    Result Date: 9/2/2024  EXAMINATION: XR CHEST 1 VIEW CLINICAL HISTORY: tube placement; TECHNIQUE: Frontal view(s) of the chest. COMPARISON: No relevant comparison studies available at the time of dictation. FINDINGS: Endotracheal tube tip lies 3.5 cm above the samir.  Normal cardiac silhouette.  The lungs are well-inflated.  No consolidation identified.  No significant pleural effusion or discernible pneumothorax.     Endotracheal tube tip 3.5 cm above the samir. Electronically signed by: Eyad Torres Date:    09/02/2024 Time:    13:30    CT Abdomen Pelvis With IV Contrast NO Oral Contrast    Result Date: 9/1/2024  EXAMINATION: CT of the abdomen pelvis with contrast CLINICAL HISTORY: Rectal bleeding COMPARISON: 08/08/2024 TECHNIQUE: Routine CT of the abdomen pelvis performed with intravenous contrast Total DLP: 447.45  mGy.cm Automatic exposure control was utilized to reduce the patient's dose FINDINGS: The visualized lung bases are clear. The liver, spleen, pancreas, gallbladder, adrenal glands, and right kidney are unremarkable.  There is a simple cyst in the inferior pole of the left kidney that measures 0.9 cm.  No hydronephrosis.  No perinephric collection.  The abdominal aorta normal caliber.  No abdominopelvic adenopathy.  There is right inguinal hernia containing portion of small bowel loops without evidence for high-grade obstruction or bowel inflammatory changes.  No free air, free fluid, fluid collection.  Bladder contours are normal. No osseous destructive process.     No acute intra-abdominal intrapelvic abnormality. Bowel containing right inguinal hernia without evidence  for obstruction or inflammatory changes. Electronically signed by: Dominic Fields MD Date:    09/01/2024 Time:    19:14    CT Renal Stone Study ABD Pelvis WO    Result Date: 8/8/2024  EXAMINATION: CT RENAL STONE STUDY ABD PELVIS WO CLINICAL HISTORY: Flank pain, kidney stone suspected; TECHNIQUE: CT imaging was performed of the abdomen and pelvis without intravenous contrast. Dose length product is 484 mGycm. Automatic exposure control, adjustment of mA/kV or iterative reconstruction technique was used to limit radiation dose. COMPARISON: None FINDINGS: Assessment of the visceral organs and vasculature is limited by the lack of IV contrast. Liver/biliary: No concerning hepatic findings. No radiodense gallstone or biliary dilatation appreciated. Pancreas: Normal. Spleen: Normal. Adrenals: Normal. Kidneys, ureters and bladder: There is a 3 mm calculus in the distal left ureter.  There is mild left hydronephrosis with perinephric stranding.  The bladder is within normal limits. Reproductive organs: No pelvic masses. Stomach/bowel: No evidence of bowel obstruction. Normal appendix. No discernible bowel inflammation. Lymph nodes: No pathologically enlarged lymph node identified with noncontrast technique. Peritoneum: No ascites or free air. Vessels: Mild scattered vascular calcifications. Abdominal wall: Right inguinal hernia containing a loop of the terminal ileum. Lung bases: No consolidation or pleural effusion. Bones: No acute osseous findings.     1. 3 mm calculus in the distal left ureter with mild left hydronephrosis and perinephric inflammatory changes. 2. Bowel containing right inguinal hernia without obstruction. Electronically signed by: Zakiya Batres Date:    08/08/2024 Time:    07:10             ICD-10-CM ICD-9-CM   1. Acute blood loss anemia  D62 285.1   2. GIB (gastrointestinal bleeding)  K92.2 578.9          ASSESSMENT & PLAN:   Tony Zarate is a 54 y.o. male who presents to the ED for rectal  bleeding.  Patient reports it started approximately 4:00 a.m. in the morning.  He states multiple episodes of bloody stool.  He also reports passing out 3 times a day.  He denies any fevers or abdominal pain.  He was not on blood thinners.  Patient was never experienced similar episode in the past.       Patient has been taking some pain medication for ureteric stone.  Initially patient said that he was taking Advil.  Later he said it was probably Tylenol.  There seemed to be some confusion about the pain meds.  He described dark stools and significant amount of diarrhea since this morning.  Also had syncopal episode as above.  The patient was initially seen at outside Hospital and was transferred to the floor at [facility] from emergency room.  I was informed by this patient by Dr. Nash about 30 minutes ago when he met me on the floor.  Type and screen has been ordered and is in the process starting packed RBC transfusion.     Recommendations:   1. IV PPI infusion.    2. Avoid NSAIDs or anticoagulation.    3. Patient is in the process of receiving 2 units of packed RBC transfusion.  In fact I will go ahead and recommend an additional unit and make total of 3 units of packed RBC.  4. He is in the process of getting transferred to ICU.  Hemoglobin was around 16 a month ago.  He has had a significant drop of hemoglobin along with syncopal episode.  This seems to be consistent with a acute upper GI bleed.  He would need upper endoscopy in the next 12-24 hours after volume resuscitation with packed RBC as well as fluid.  4. I have discussed above plan with the patient and his family at the bedside in details.  They are in agreement.  Upper endoscopy will be performed tomorrow morning.  This can be done earlier if necessary.    9/4/24  Patient seems to be doing well from GI standpoint.  Status post embolization of multiple pancreaticoduodenal branches per Interventional Radiology.  Hemodynamically  stable.  Extubated today.  IV ppi.  Obtain stool for H pylori antigen.  Treat if positive.  Sparingly clears as per ICU team.  To be advanced gradually.  Appreciate Interventional Radiology input and intervention.

## 2024-09-04 NOTE — NURSING
Nurses Note -- 4 Eyes      9/4/2024   3:49 AM      Skin assessed during: Daily Assessment      [x] No Altered Skin Integrity Present    [x]Prevention Measures Documented      [] Yes- Altered Skin Integrity Present or Discovered   [] LDA Added if Not in Epic (Describe Wound)   [] New Altered Skin Integrity was Present on Admit and Documented in LDA   [] Wound Image Taken    Wound Care Consulted? No    Attending Nurse:  NANCY Rose     Second RN/Staff Member:  NANCY Lewis

## 2024-09-05 LAB
ABO + RH BLD: NORMAL
ALBUMIN SERPL-MCNC: 2.4 G/DL (ref 3.5–5)
ALBUMIN/GLOB SERPL: 1.1 RATIO (ref 1.1–2)
ALP SERPL-CCNC: 42 UNIT/L (ref 40–150)
ALT SERPL-CCNC: 10 UNIT/L (ref 0–55)
ANION GAP SERPL CALC-SCNC: 8 MEQ/L
APTT PPP: 27.7 SECONDS (ref 23.2–33.7)
AST SERPL-CCNC: 15 UNIT/L (ref 5–34)
BASOPHILS # BLD AUTO: 0.02 X10(3)/MCL
BASOPHILS NFR BLD AUTO: 0.3 %
BASOPHILS NFR BLD AUTO: 0.4 %
BASOPHILS NFR BLD AUTO: 0.4 %
BILIRUB SERPL-MCNC: 0.5 MG/DL
BLD PROD TYP BPU: NORMAL
BLOOD UNIT EXPIRATION DATE: NORMAL
BLOOD UNIT TYPE CODE: 5100
BUN SERPL-MCNC: 13.7 MG/DL (ref 8.4–25.7)
CALCIUM SERPL-MCNC: 7.8 MG/DL (ref 8.4–10.2)
CHLORIDE SERPL-SCNC: 108 MMOL/L (ref 98–107)
CO2 SERPL-SCNC: 29 MMOL/L (ref 22–29)
CREAT SERPL-MCNC: 0.71 MG/DL (ref 0.73–1.18)
CREAT/UREA NIT SERPL: 19
CROSSMATCH INTERPRETATION: NORMAL
DISPENSE STATUS: NORMAL
EOSINOPHIL # BLD AUTO: 0.15 X10(3)/MCL (ref 0–0.9)
EOSINOPHIL # BLD AUTO: 0.16 X10(3)/MCL (ref 0–0.9)
EOSINOPHIL # BLD AUTO: 0.2 X10(3)/MCL (ref 0–0.9)
EOSINOPHIL NFR BLD AUTO: 2.4 %
EOSINOPHIL NFR BLD AUTO: 3.1 %
EOSINOPHIL NFR BLD AUTO: 4.1 %
ERYTHROCYTE [DISTWIDTH] IN BLOOD BY AUTOMATED COUNT: 18.5 % (ref 11.5–17)
ERYTHROCYTE [DISTWIDTH] IN BLOOD BY AUTOMATED COUNT: 18.5 % (ref 11.5–17)
ERYTHROCYTE [DISTWIDTH] IN BLOOD BY AUTOMATED COUNT: 18.6 % (ref 11.5–17)
FFP: NORMAL
FIBRINOGEN PPP-MCNC: 467 MG/DL (ref 210–463)
GFR SERPLBLD CREATININE-BSD FMLA CKD-EPI: >60 ML/MIN/1.73/M2
GLOBULIN SER-MCNC: 2.1 GM/DL (ref 2.4–3.5)
GLUCOSE SERPL-MCNC: 98 MG/DL (ref 74–100)
H. PYLORI STOOL: NEGATIVE
HAPTOGLOB SERPL-MCNC: 147 MG/DL (ref 14–258)
HCT VFR BLD AUTO: 26.6 % (ref 42–52)
HCT VFR BLD AUTO: 27.2 % (ref 42–52)
HCT VFR BLD AUTO: 31.4 % (ref 42–52)
HGB BLD-MCNC: 10.3 G/DL (ref 14–18)
HGB BLD-MCNC: 8.7 G/DL (ref 14–18)
HGB BLD-MCNC: 9.1 G/DL (ref 14–18)
IMM GRANULOCYTES # BLD AUTO: 0.01 X10(3)/MCL (ref 0–0.04)
IMM GRANULOCYTES # BLD AUTO: 0.02 X10(3)/MCL (ref 0–0.04)
IMM GRANULOCYTES # BLD AUTO: 0.02 X10(3)/MCL (ref 0–0.04)
IMM GRANULOCYTES NFR BLD AUTO: 0.2 %
IMM GRANULOCYTES NFR BLD AUTO: 0.3 %
IMM GRANULOCYTES NFR BLD AUTO: 0.4 %
INR PPP: 1
LYMPHOCYTES # BLD AUTO: 0.69 X10(3)/MCL (ref 0.6–4.6)
LYMPHOCYTES # BLD AUTO: 0.7 X10(3)/MCL (ref 0.6–4.6)
LYMPHOCYTES # BLD AUTO: 0.73 X10(3)/MCL (ref 0.6–4.6)
LYMPHOCYTES NFR BLD AUTO: 11.1 %
LYMPHOCYTES NFR BLD AUTO: 14.1 %
LYMPHOCYTES NFR BLD AUTO: 14.3 %
MCH RBC QN AUTO: 27.7 PG (ref 27–31)
MCH RBC QN AUTO: 28 PG (ref 27–31)
MCH RBC QN AUTO: 28.1 PG (ref 27–31)
MCHC RBC AUTO-ENTMCNC: 32.7 G/DL (ref 33–36)
MCHC RBC AUTO-ENTMCNC: 32.8 G/DL (ref 33–36)
MCHC RBC AUTO-ENTMCNC: 33.5 G/DL (ref 33–36)
MCV RBC AUTO: 83.7 FL (ref 80–94)
MCV RBC AUTO: 84.7 FL (ref 80–94)
MCV RBC AUTO: 85.6 FL (ref 80–94)
MONOCYTES # BLD AUTO: 0.46 X10(3)/MCL (ref 0.1–1.3)
MONOCYTES # BLD AUTO: 0.48 X10(3)/MCL (ref 0.1–1.3)
MONOCYTES # BLD AUTO: 0.6 X10(3)/MCL (ref 0.1–1.3)
MONOCYTES NFR BLD AUTO: 8.9 %
MONOCYTES NFR BLD AUTO: 9.6 %
MONOCYTES NFR BLD AUTO: 9.9 %
NEUTROPHILS # BLD AUTO: 3.43 X10(3)/MCL (ref 2.1–9.2)
NEUTROPHILS # BLD AUTO: 3.78 X10(3)/MCL (ref 2.1–9.2)
NEUTROPHILS # BLD AUTO: 4.79 X10(3)/MCL (ref 2.1–9.2)
NEUTROPHILS NFR BLD AUTO: 71.1 %
NEUTROPHILS NFR BLD AUTO: 73.1 %
NEUTROPHILS NFR BLD AUTO: 76.3 %
NRBC BLD AUTO-RTO: 0 %
PLATELET # BLD AUTO: 65 X10(3)/MCL (ref 130–400)
PLATELET # BLD AUTO: 86 X10(3)/MCL (ref 130–400)
PLATELET # BLD AUTO: 88 X10(3)/MCL (ref 130–400)
PLATELETS.RETICULATED NFR BLD AUTO: 4 % (ref 0.9–11.2)
PLATELETS.RETICULATED NFR BLD AUTO: 4.1 % (ref 0.9–11.2)
PMV BLD AUTO: 10.1 FL (ref 7.4–10.4)
PMV BLD AUTO: 10.3 FL (ref 7.4–10.4)
PMV BLD AUTO: 10.6 FL (ref 7.4–10.4)
POTASSIUM SERPL-SCNC: 3 MMOL/L (ref 3.5–5.1)
PROT SERPL-MCNC: 4.5 GM/DL (ref 6.4–8.3)
PROTHROMBIN TIME: 13.7 SECONDS (ref 12.5–14.5)
RBC # BLD AUTO: 3.14 X10(6)/MCL (ref 4.7–6.1)
RBC # BLD AUTO: 3.25 X10(6)/MCL (ref 4.7–6.1)
RBC # BLD AUTO: 3.67 X10(6)/MCL (ref 4.7–6.1)
SODIUM SERPL-SCNC: 145 MMOL/L (ref 136–145)
UNIT NUMBER: NORMAL
WBC # BLD AUTO: 4.83 X10(3)/MCL (ref 4.5–11.5)
WBC # BLD AUTO: 5.17 X10(3)/MCL (ref 4.5–11.5)
WBC # BLD AUTO: 6.28 X10(3)/MCL (ref 4.5–11.5)

## 2024-09-05 PROCEDURE — 25000003 PHARM REV CODE 250: Performed by: INTERNAL MEDICINE

## 2024-09-05 PROCEDURE — 21400001 HC TELEMETRY ROOM

## 2024-09-05 PROCEDURE — 85025 COMPLETE CBC W/AUTO DIFF WBC: CPT | Performed by: INTERNAL MEDICINE

## 2024-09-05 PROCEDURE — 36415 COLL VENOUS BLD VENIPUNCTURE: CPT | Performed by: INTERNAL MEDICINE

## 2024-09-05 PROCEDURE — 63600175 PHARM REV CODE 636 W HCPCS

## 2024-09-05 PROCEDURE — 83010 ASSAY OF HAPTOGLOBIN QUANT: CPT

## 2024-09-05 PROCEDURE — 27000221 HC OXYGEN, UP TO 24 HOURS

## 2024-09-05 PROCEDURE — 85384 FIBRINOGEN ACTIVITY: CPT

## 2024-09-05 PROCEDURE — 94761 N-INVAS EAR/PLS OXIMETRY MLT: CPT

## 2024-09-05 PROCEDURE — 85730 THROMBOPLASTIN TIME PARTIAL: CPT

## 2024-09-05 PROCEDURE — 99900035 HC TECH TIME PER 15 MIN (STAT)

## 2024-09-05 PROCEDURE — 99900031 HC PATIENT EDUCATION (STAT)

## 2024-09-05 PROCEDURE — 85610 PROTHROMBIN TIME: CPT

## 2024-09-05 PROCEDURE — 80053 COMPREHEN METABOLIC PANEL: CPT

## 2024-09-05 RX ORDER — POTASSIUM CHLORIDE 14.9 MG/ML
20 INJECTION INTRAVENOUS
Status: COMPLETED | OUTPATIENT
Start: 2024-09-05 | End: 2024-09-05

## 2024-09-05 RX ADMIN — MUPIROCIN: 20 OINTMENT TOPICAL at 09:09

## 2024-09-05 RX ADMIN — POTASSIUM CHLORIDE 20 MEQ: 14.9 INJECTION, SOLUTION INTRAVENOUS at 04:09

## 2024-09-05 RX ADMIN — MUPIROCIN: 20 OINTMENT TOPICAL at 08:09

## 2024-09-05 RX ADMIN — POTASSIUM CHLORIDE 20 MEQ: 14.9 INJECTION, SOLUTION INTRAVENOUS at 09:09

## 2024-09-05 RX ADMIN — PANTOPRAZOLE SODIUM 40 MG: 40 INJECTION, POWDER, LYOPHILIZED, FOR SOLUTION INTRAVENOUS at 08:09

## 2024-09-05 RX ADMIN — PANTOPRAZOLE SODIUM 40 MG: 40 INJECTION, POWDER, LYOPHILIZED, FOR SOLUTION INTRAVENOUS at 09:09

## 2024-09-05 RX ADMIN — POTASSIUM CHLORIDE 20 MEQ: 14.9 INJECTION, SOLUTION INTRAVENOUS at 06:09

## 2024-09-05 NOTE — PLAN OF CARE
Problem: Adult Inpatient Plan of Care  Goal: Plan of Care Review  9/5/2024 0807 by Shannan Wesley RN  Outcome: Progressing  9/5/2024 0807 by Shannan Wesley RN  Outcome: Progressing  Goal: Patient-Specific Goal (Individualized)  9/5/2024 0807 by Shannan Wesley RN  Outcome: Progressing  9/5/2024 0807 by Shannan Wesley RN  Outcome: Progressing  Goal: Absence of Hospital-Acquired Illness or Injury  9/5/2024 0807 by Shannan Wesley RN  Outcome: Progressing  9/5/2024 0807 by Shannan Wesley RN  Outcome: Progressing  Goal: Optimal Comfort and Wellbeing  9/5/2024 0807 by Shannan Wesley RN  Outcome: Progressing  9/5/2024 0807 by Shannan Wesley RN  Outcome: Progressing  Goal: Readiness for Transition of Care  9/5/2024 0807 by Shannan Wesley RN  Outcome: Progressing  9/5/2024 0807 by Shannan Wesley RN  Outcome: Progressing     Problem: Fall Injury Risk  Goal: Absence of Fall and Fall-Related Injury  9/5/2024 0807 by Shannan Wesley RN  Outcome: Progressing  9/5/2024 0807 by Shannan Wesley RN  Outcome: Progressing     Problem: Skin Injury Risk Increased  Goal: Skin Health and Integrity  9/5/2024 0807 by Shannan Wesley RN  Outcome: Progressing  9/5/2024 0807 by Shannan Wesley RN  Outcome: Progressing     Problem: Infection  Goal: Absence of Infection Signs and Symptoms  9/5/2024 0807 by Shannan Wesley RN  Outcome: Progressing  9/5/2024 0807 by Shannan Wesley RN  Outcome: Progressing

## 2024-09-05 NOTE — PROGRESS NOTES
Tony Zarate   MRN: [id number]   ADMISSION DATE: 2024  : 1970  AGE: 54 y.o.    DATE :  2024       PROVIDER: ROSALINDA SHELLEY      SUBJECTIVE:  Patient seems to be doing well.  Extubated.  No overt GI bleed.  Patient was transferred to the floor today.      Review of Systems   Constitutional:  Negative for chills and fever.   HENT:  Negative for congestion and nosebleeds.    Eyes:  Negative for redness.   Respiratory:  Negative for cough, chest tightness and shortness of breath.    Cardiovascular:  Negative for chest pain, palpitations and leg swelling.   Gastrointestinal:  Positive for blood in stool (Significant improvement of the GI bleed.  No dez bleeding reported). Negative for abdominal distention, abdominal pain, constipation, diarrhea, nausea and vomiting.   Endocrine: Negative for cold intolerance and polydipsia.   Genitourinary:  Negative for dysuria and flank pain.   Musculoskeletal:  Negative for arthralgias.   Skin:  Negative for pallor.   Allergic/Immunologic: Negative for food allergies.   Neurological:  Negative for dizziness and headaches.   Hematological:  Does not bruise/bleed easily.   Psychiatric/Behavioral:  Negative for agitation and confusion.         Review of patient's allergies indicates:  No Known Allergies      mupirocin   Nasal BID    pantoprazole  40 mg Intravenous BID       Medications Discontinued During This Encounter   Medication Reason    0.9%  NaCl infusion (for blood administration)     0.9%  NaCl infusion (for blood administration)     0.9%  NaCl infusion (for blood administration)     0.9%  NaCl infusion (for blood administration)     0.9%  NaCl infusion (for blood administration)     0.9%  NaCl infusion (for blood administration)     0.9%  NaCl infusion (for blood administration)     0.9%  NaCl infusion (for blood administration)     0.9%  NaCl infusion (for blood administration)     0.9%  NaCl infusion (for blood administration)     0.9%  NaCl infusion (for  blood administration)     0.9%  NaCl infusion (for blood administration)     0.9%  NaCl infusion (for blood administration)     0.9%  NaCl infusion (for blood administration)     0.9%  NaCl infusion (for blood administration)     0.9%  NaCl infusion (for blood administration)     EPINEPHrine (ADRENALIN) 0.1 mg/mL injection Returned to ADS    pantoprazole (PROTONIX) 40 mg in 0.9% NaCl 100 mL IVPB (MB+)     lactated ringers infusion     propofol (DIPRIVAN) 10 mg/mL infusion     NORepinephrine 8 mg in dextrose 5% 250 mL infusion                History reviewed. No pertinent past medical history.   Social History     Socioeconomic History    Marital status:      Social Determinants of Health     Financial Resource Strain: Low Risk  (9/4/2024)    Overall Financial Resource Strain (CARDIA)     Difficulty of Paying Living Expenses: Not hard at all   Food Insecurity: No Food Insecurity (9/4/2024)    Hunger Vital Sign     Worried About Running Out of Food in the Last Year: Never true     Ran Out of Food in the Last Year: Never true   Transportation Needs: No Transportation Needs (9/4/2024)    TRANSPORTATION NEEDS     Transportation : No   Physical Activity: Inactive (9/4/2024)    Exercise Vital Sign     Days of Exercise per Week: 0 days     Minutes of Exercise per Session: 0 min   Stress: No Stress Concern Present (9/4/2024)    [employer] - Occupational Stress Questionnaire     Feeling of Stress : Not at all   Housing Stability: Low Risk  (9/4/2024)    Housing Stability Vital Sign     Unable to Pay for Housing in the Last Year: No     Homeless in the Last Year: No      Past Surgical History:   Procedure Laterality Date    EGD, WITH HEMORRHAGE CONTROL  9/2/2024    Procedure: EGD,WITH HEMORRHAGE CONTROL;  Surgeon: Jason Napier MD;  Location: [facility] ENDOSCOPY;  Service: Gastroenterology;;  5cc epi    ESOPHAGOGASTRODUODENOSCOPY N/A 9/2/2024    Procedure: EGD (ESOPHAGOGASTRODUODENOSCOPY);   Surgeon: Jason Napier MD;  Location: [facility] ENDOSCOPY;  Service: Gastroenterology;  Laterality: N/A;    ESOPHAGOGASTRODUODENOSCOPY N/A 9/3/2024    Procedure: EGD (ESOPHAGOGASTRODUODENOSCOPY);  Surgeon: Jason Napier MD;  Location: [facility] ENDOSCOPY;  Service: Gastroenterology;  Laterality: N/A;  09/03/24: Anesthesia needed        OBJECTIVE:     Vitals:    09/05/24 0500 09/05/24 0600 09/05/24 0805 09/05/24 1300   BP: (!) 101/57 (!) 92/55  132/75   BP Location:       Patient Position:       Pulse: (!) 58 (!) 51  65   Resp: 16 15     Temp:    98.2 °F (36.8 °C)   TempSrc:       SpO2: 100% 100% 100%    Weight:       Height:           Physical Exam  HENT:      Head: Normocephalic and atraumatic.      Nose: Nose normal.      Mouth/Throat:      Pharynx: Oropharynx is clear.   Eyes:      Extraocular Movements: Extraocular movements intact.      Conjunctiva/sclera: Conjunctivae normal.      Pupils: Pupils are equal, round, and reactive to light.   Cardiovascular:      Rate and Rhythm: Normal rate and regular rhythm.   Pulmonary:      Effort: Pulmonary effort is normal.      Breath sounds: Normal breath sounds.   Abdominal:      General: Bowel sounds are normal.      Palpations: Abdomen is soft.   Musculoskeletal:         General: Normal range of motion.      Cervical back: Neck supple.   Skin:     General: Skin is warm and dry.   Neurological:      Mental Status: He is alert and oriented to person, place, and time.   Psychiatric:         Mood and Affect: Mood normal.            LABS    Recent Labs   Lab 09/04/24  2348 09/05/24  0210 09/05/24  1058   WBC 6.28 5.17 4.83   HGB 9.1* 8.7* 10.3*   HCT 27.2* 26.6* 31.4*   PLT 86* 65* 88*      Recent Labs   Lab 09/03/24  0454 09/04/24  0342 09/05/24  0210    143 145   K 4.0 3.2* 3.0*   * 113* 108*   CO2 22 26 29   BUN 28.5* 17.1 13.7   CREATININE 0.76 0.76 0.71*   CALCIUM 7.1* 7.5* 7.8*   BILITOT 0.3 0.4 0.5   ALKPHOS 34* 39* 42   ALT 8 12 10   AST 12 16 15  "  GLUCOSE 129* 103* 98      Recent Labs   Lab 09/05/24  1058   INR 1.0    No results for input(s): "AMYLASE" in the last 168 hours.   Recent Labs   Lab 09/01/24  1741 09/02/24  0730 09/03/24  0454 09/05/24  1058   APTT  --  24.0 29.2 27.7   INR 1.1  --  1.2 1.0           RESULTS: IR Embolization    Result Date: 9/3/2024  PROCEDURE: Mesenteric Angiography with GDA Branch Coil Embolization CLINICAL HISTORY: 54-year-old male with upper GI bleed and duodenal hemorrhage ANESTHESIA: Level of sedation: General Anesthesia Antibiotic prophylaxis: Ancef 2 g IV PHYSICIANS: Will Costa MD RADIATION DOSE: Fluoroscopy time: 23.3 minutes Radiation exposure dose: 709.95 mGy Exposure images: 478 CONTRAST: 150 cc of Isovue 370 PROCEDURAL SUMMARY: The procedure was deemed emergent for consent purposes.  The patient was placed supine on the angiography table. The skin overlying the right groin was then prepped and draped in the usual sterile fashion. The skin and soft tissues were anesthetized using 1% lidocaine.  The bony landmarks were confirmed under fluoroscopy.  Under real time ultrasound guidance, a 21g micropuncture needle was advanced into the common femoral artery. Return of blood flow was confirmed and a mandril wire was advanced into the needle under fluoroscopic guidance. The needle was removed and a microdilator was advanced over the wire into the artery.  The inner dilator and wire were removed and a [employer] guidewire was advanced through the sheath into the artery under fluoroscopic guidance.  The sheath was removed. A 5F vascular sheath was advanced into the common femoral artery and the inner dilator was removed.  The sheath was flushed. A 5F Valmy Cobra catheter was advance through the sheath over the wire into the arterial system.  The celiac and superior mesenteric artery were selected.  Angiography demonstrated normal vascular anatomy.  The GDA was difficult to opacify from the celiac artery.  The glide Cobra " catheter was exchanged for a Reed catheter over a Bentson guidewire. A decision was made to proceed with embolization. A coaxial system of a TruSelect microcatheter with a Fathom microwire was inserted through the select catheter. The microcatheter and microwire were used to select the gastroduodenal artery.  A superior branch of the gastroduodenal artery (superior pancreaticoduodenal) was selected and angiography was performed.  Angiography raise concern for active extravasation and vessel spasm.  Angiography confirmed positioning within the targeted vessel.  A 2 mm x 4 cm Embold coil was deployed within the suspected vessel.  Post embolization angiography confirms stasis within the target vessel.  The microcatheter was used to further investigate the gastroduodenal branches from the celiac.  A 2nd superior pancreaticoduodenal branch was selected.  Angiography demonstrated concern for distal pseudoaneurysm arising from this branch.  A 2 mm x 6 cm Embold coil was deployed in the second target vessel with post embolization angiography confirming stasis. Attention was then turned to the superior mesenteric artery.  The Reed catheter was removed over a Bentson guidewire and the glide Cobra catheter was reinserted.  This was used to select the superior mesenteric artery.  Angiography was performed within the SMA.  A definitive pseudoaneurysm was seen arising from a pancreaticoduodenal branch.  The microcatheter microwire were reinserted and used to select the target vessel.  A 2 mm x 4 cm Embold coil was deployed within the vessel.  After deployment, contrast extravasation was noted.  A second Embold coil was deployed within this target vessel measuring 2 mm x 6 cm.  Post embolization angiography confirms stasis through the targeted vessel. Once embolization was complete, angiography was performed through the microcatheter confirming technically successful occlusion of the targeted branch.  The microcatheter  was removed and angiography was repeated through parent catheter.  This confirmed successful embolization of the target vessel with no other vascular abnormality identified.  The catheter was then removed over a [employer] guidewire.  Celiac angiography was repeated to ensure no further bleeding vessels are seen in the region of the duodenum.  All catheters were then removed over a wire. The groin sheath was flushed.  Angiography of the common femoral artery was performed via the vascular sheath.  Angiography of the common femoral artery demonstrated no evidence of injury. A Mynx closure device was deployed for hemostasis and the sheath was removed.  A sterile dressing was applied. The patient tolerated the procedure well and experienced no immediate complications. The patient was then brought to the recovery room in good condition.     Technically successful Coli Embolization of the duodenal bleed as visualized on angiography. Electronically signed by: Will Costa Date:    09/03/2024 Time:    15:48    X-Ray Chest 1 View    Result Date: 9/3/2024  EXAMINATION: XR CHEST 1 VIEW CLINICAL HISTORY: SOB; TECHNIQUE: Single frontal view of the chest was performed. COMPARISON: 09/02/2024 FINDINGS: LINES AND TUBES: Endotracheal tube projects over the trachea with tip above the samir.  EKG/telemetry leads overlie the chest. MEDIASTINUM AND LORRIE: The cardiac silhouette is normal. LUNGS: No lobar consolidation. No edema. PLEURA:No pleural effusion. No pneumothorax. OTHER: No acute osseous abnormality.     No acute cardiopulmonary abnormality. Electronically signed by: Jayla Santiago Date:    09/03/2024 Time:    14:25    X-Ray Chest 1 View    Result Date: 9/3/2024  EXAMINATION: XR CHEST 1 VIEW CLINICAL HISTORY: acute lung ds; COMPARISON: Earlier today FINDINGS: Frontal view of the chest was obtained. Interval extubation.  Right-sided central line tip overlies the distal SVC.  The heart is not enlarged.  Grossly clear lungs.  No  pneumothorax.     Interval extubation with placement of a right-sided central line.  No acute findings otherwise. Electronically signed by: Yinka Hendrickson Date:    09/03/2024 Time:    11:09    X-Ray Chest 1 View    Result Date: 9/2/2024  EXAMINATION: XR CHEST 1 VIEW CLINICAL HISTORY: tube placement; TECHNIQUE: Frontal view(s) of the chest. COMPARISON: No relevant comparison studies available at the time of dictation. FINDINGS: Endotracheal tube tip lies 3.5 cm above the samir.  Normal cardiac silhouette.  The lungs are well-inflated.  No consolidation identified.  No significant pleural effusion or discernible pneumothorax.     Endotracheal tube tip 3.5 cm above the samir. Electronically signed by: Eyad Torres Date:    09/02/2024 Time:    13:30    CT Abdomen Pelvis With IV Contrast NO Oral Contrast    Result Date: 9/1/2024  EXAMINATION: CT of the abdomen pelvis with contrast CLINICAL HISTORY: Rectal bleeding COMPARISON: 08/08/2024 TECHNIQUE: Routine CT of the abdomen pelvis performed with intravenous contrast Total DLP: 447.45  mGy.cm Automatic exposure control was utilized to reduce the patient's dose FINDINGS: The visualized lung bases are clear. The liver, spleen, pancreas, gallbladder, adrenal glands, and right kidney are unremarkable.  There is a simple cyst in the inferior pole of the left kidney that measures 0.9 cm.  No hydronephrosis.  No perinephric collection.  The abdominal aorta normal caliber.  No abdominopelvic adenopathy.  There is right inguinal hernia containing portion of small bowel loops without evidence for high-grade obstruction or bowel inflammatory changes.  No free air, free fluid, fluid collection.  Bladder contours are normal. No osseous destructive process.     No acute intra-abdominal intrapelvic abnormality. Bowel containing right inguinal hernia without evidence for obstruction or inflammatory changes. Electronically signed by: Dominic Fields MD Date:    09/01/2024  Time:    19:14    CT Renal Stone Study ABD Pelvis WO    Result Date: 8/8/2024  EXAMINATION: CT RENAL STONE STUDY ABD PELVIS WO CLINICAL HISTORY: Flank pain, kidney stone suspected; TECHNIQUE: CT imaging was performed of the abdomen and pelvis without intravenous contrast. Dose length product is 484 mGycm. Automatic exposure control, adjustment of mA/kV or iterative reconstruction technique was used to limit radiation dose. COMPARISON: None FINDINGS: Assessment of the visceral organs and vasculature is limited by the lack of IV contrast. Liver/biliary: No concerning hepatic findings. No radiodense gallstone or biliary dilatation appreciated. Pancreas: Normal. Spleen: Normal. Adrenals: Normal. Kidneys, ureters and bladder: There is a 3 mm calculus in the distal left ureter.  There is mild left hydronephrosis with perinephric stranding.  The bladder is within normal limits. Reproductive organs: No pelvic masses. Stomach/bowel: No evidence of bowel obstruction. Normal appendix. No discernible bowel inflammation. Lymph nodes: No pathologically enlarged lymph node identified with noncontrast technique. Peritoneum: No ascites or free air. Vessels: Mild scattered vascular calcifications. Abdominal wall: Right inguinal hernia containing a loop of the terminal ileum. Lung bases: No consolidation or pleural effusion. Bones: No acute osseous findings.     1. 3 mm calculus in the distal left ureter with mild left hydronephrosis and perinephric inflammatory changes. 2. Bowel containing right inguinal hernia without obstruction. Electronically signed by: Zakiya Batres Date:    08/08/2024 Time:    07:10             ICD-10-CM ICD-9-CM   1. Acute blood loss anemia  D62 285.1   2. GIB (gastrointestinal bleeding)  K92.2 578.9          ASSESSMENT & PLAN:   Tony Zarate is a 54 y.o. male who presents to the ED for rectal bleeding.  Patient reports it started approximately 4:00 a.m. in the morning.  He states multiple episodes of  bloody stool.  He also reports passing out 3 times a day.  He denies any fevers or abdominal pain.  He was not on blood thinners.  Patient was never experienced similar episode in the past.       Patient has been taking some pain medication for ureteric stone.  Initially patient said that he was taking Advil.  Later he said it was probably Tylenol.  There seemed to be some confusion about the pain meds.  He described dark stools and significant amount of diarrhea since this morning.  Also had syncopal episode as above.  The patient was initially seen at outside Hospital and was transferred to the floor at [facility] from emergency room.  I was informed by this patient by Dr. Nash about 30 minutes ago when he met me on the floor.  Type and screen has been ordered and is in the process starting packed RBC transfusion.     Recommendations:   1. IV PPI infusion.    2. Avoid NSAIDs or anticoagulation.    3. Patient is in the process of receiving 2 units of packed RBC transfusion.  In fact I will go ahead and recommend an additional unit and make total of 3 units of packed RBC.  4. He is in the process of getting transferred to ICU.  Hemoglobin was around 16 a month ago.  He has had a significant drop of hemoglobin along with syncopal episode.  This seems to be consistent with a acute upper GI bleed.  He would need upper endoscopy in the next 12-24 hours after volume resuscitation with packed RBC as well as fluid.  4. I have discussed above plan with the patient and his family at the bedside in details.  They are in agreement.  Upper endoscopy will be performed tomorrow morning.  This can be done earlier if necessary.    9/4/24  Patient seems to be doing well from GI standpoint.  Status post embolization of multiple pancreaticoduodenal branches per Interventional Radiology.  Hemodynamically stable.  Extubated today.  IV ppi.  Obtain stool for H pylori antigen.  Treat if positive.  Sparingly clears as per  ICU team.  To be advanced gradually.  Appreciate Interventional Radiology input and intervention.    9/5/24  Events noted.  Patient denies any significant abdominal pain.  Reported have some dark stools.  To the floor today.  Continue with PPI along with Carafate.  Follow up stool for H pylori antigen.  Treat if positive.  Liquid diet for now.  To be gradually increase as tolerated.  Avoid NSAIDs or anticoagulation.    Addendum: Above discussed with the patient and his wife in detail at the bedside.  Questions were answered.

## 2024-09-05 NOTE — NURSING
Nurses Note -- 4 Eyes      9/5/2024   8:08 AM      Skin assessed during: Daily Assessment      [x] No Altered Skin Integrity Present    [x]Prevention Measures Documented      [] Yes- Altered Skin Integrity Present or Discovered   [] LDA Added if Not in Epic (Describe Wound)   [] New Altered Skin Integrity was Present on Admit and Documented in LDA   [] Wound Image Taken    Wound Care Consulted? No    Attending Nurse:  Shannan Chavarria RN/Staff Member:   javier

## 2024-09-05 NOTE — PROGRESS NOTES
[facility] - Oncology Acute  Pulmonary Critical Care Note    Patient Name: Tony Zarate  MRN: [id number]  Admission Date: 9/1/2024  Hospital Length of Stay: 4 days  Code Status: Full Code  Attending Provider: Natasha Garza MD  Primary Care Provider: Mirlande Zarate NP     Subjective:     HPI:   Patient is a 54-year-old male with no significant past medical history who presented to an outside ER with a complaint of bloody bowel movements multi multiple times over the last 1 day with an associated syncopal episode.  At the prior facility, workup showed a drop in his hemoglobin from 16 to 9 and patient was transferred to formerly [facility] for GI services.  CT abdomen displayed no acute pathology.  On arrival to our facility, patient had an episode of hematemesis (approximately 15 cc according to nursing staff) and another syncopal episode.  Patient states that he has been noticing multiple episodes of black/tarry stools over the last day.  He denies fever, chills, other prior episodes of hematemesis, abdominal pain, history of GERD, hematochezia.  Patient denies risk factors such as NSAID use, alcohol use, and tobacco abuse.  Patient denies history of previous GI bleed or GI pathology in the past.  Patient states that he has never underwent EGD or colonoscopy in the past.      Patient was evaluated by GI who recommended ICU admission.  Patient was started on IV PPI infusion and transfused 3 units pRBC.      09/02/2024- EGD  09/03/2024- IR embolization      24 Hour Interval History  S/p embolization of multiple pancreatic duodenal branches by IR.  Nursing reported no further GI bleeding overnight.  H&H is 8.7/26.6.  Potassium this morning 3.0. Off vasopressor support. Patient denies any SOB, lightheadedness, pain anywhere. Able to ambulate overnight. Upper endoscopy by GI today.         Past Surgical History:   Procedure Laterality Date    EGD, WITH HEMORRHAGE CONTROL  9/2/2024    Procedure: EGD,WITH HEMORRHAGE  CONTROL;  Surgeon: Jason Napier MD;  Location: [facility] ENDOSCOPY;  Service: Gastroenterology;;  5cc epi    ESOPHAGOGASTRODUODENOSCOPY N/A 9/2/2024    Procedure: EGD (ESOPHAGOGASTRODUODENOSCOPY);  Surgeon: Jason Napier MD;  Location: [facility] ENDOSCOPY;  Service: Gastroenterology;  Laterality: N/A;    ESOPHAGOGASTRODUODENOSCOPY N/A 9/3/2024    Procedure: EGD (ESOPHAGOGASTRODUODENOSCOPY);  Surgeon: Jason Napier MD;  Location: [facility] ENDOSCOPY;  Service: Gastroenterology;  Laterality: N/A;  09/03/24: Anesthesia needed       Social History     Socioeconomic History    Marital status:      Social Determinants of Health     Financial Resource Strain: Low Risk  (9/4/2024)    Overall Financial Resource Strain (CARDIA)     Difficulty of Paying Living Expenses: Not hard at all   Food Insecurity: No Food Insecurity (9/4/2024)    Hunger Vital Sign     Worried About Running Out of Food in the Last Year: Never true     Ran Out of Food in the Last Year: Never true   Transportation Needs: No Transportation Needs (9/4/2024)    TRANSPORTATION NEEDS     Transportation : No   Physical Activity: Inactive (9/4/2024)    Exercise Vital Sign     Days of Exercise per Week: 0 days     Minutes of Exercise per Session: 0 min   Stress: No Stress Concern Present (9/4/2024)    [employer] [place] [employer] - Occupational Stress Questionnaire     Feeling of Stress : Not at all   Housing Stability: Low Risk  (9/4/2024)    Housing Stability Vital Sign     Unable to Pay for Housing in the Last Year: No     Homeless in the Last Year: No     Current Outpatient Medications   Medication Instructions    ondansetron (ZOFRAN) 4 mg, Oral, Every 6 hours    tamsulosin (FLOMAX) 0.4 mg, Oral, Daily       Current Inpatient Medications   mupirocin   Nasal BID    pantoprazole  40 mg Intravenous BID    potassium chloride in water  20 mEq Intravenous Q2H       Current Intravenous Infusions   NORepinephrine bitartrate-D5W  0-3 mcg/kg/min  Intravenous Continuous   Stopped at 09/03/24 1605    propofoL  0-50 mcg/kg/min Intravenous Continuous   Stopped at 09/04/24 0907         Review of Systems   Unable to perform ROS: Intubated   Respiratory:  Negative for shortness of breath.    Cardiovascular:  Negative for chest pain.   Gastrointestinal:  Negative for abdominal pain and vomiting.   Neurological:  Negative for dizziness and headaches.          Objective:       Intake/Output Summary (Last 24 hours) at 9/5/2024 0735  Last data filed at 9/5/2024 0614  Gross per 24 hour   Intake 304.86 ml   Output 1790 ml   Net -1485.14 ml         Vital Signs (Most Recent):  Temp: 98.2 °F (36.8 °C) (09/05/24 0400)  Pulse: (!) 51 (09/05/24 0600)  Resp: 15 (09/05/24 0600)  BP: (!) 92/55 (09/05/24 0600)  SpO2: 100 % (09/05/24 0600)  Body mass index is 25.84 kg/m².  Weight: 74.8 kg (165 lb) Vital Signs (24h Range):  Temp:  [98.2 °F (36.8 °C)-99.8 °F (37.7 °C)] 98.2 °F (36.8 °C)  Pulse:  [51-81] 51  Resp:  [13-27] 15  SpO2:  [92 %-100 %] 100 %  BP: ()/(49-67) 92/55     Physical Exam  Vitals reviewed.   Constitutional:       Interventions: He is intubated.      Comments: Resting in chair, in no distress   Eyes:      Comments: Conjunctival pallor   Cardiovascular:      Rate and Rhythm: Normal rate and regular rhythm.      Heart sounds: No murmur heard.  Pulmonary:      Effort: Pulmonary effort is normal. He is intubated.      Breath sounds: Normal breath sounds.      Comments: On nasal cannula  Abdominal:      General: Bowel sounds are normal. There is no distension.      Palpations: Abdomen is soft.      Tenderness: There is no abdominal tenderness.   Musculoskeletal:      Right lower leg: No edema.      Left lower leg: No edema.   Skin:     Capillary Refill: Capillary refill takes 2 to 3 seconds.      Coloration: Skin is pale.   Neurological:      Mental Status: He is oriented to person, place, and time.         Lines/Drains/Airways       Central Venous Catheter Line   Duration             Trialysis (Dialysis) Catheter 09/03/24 1544 right internal jugular 1 day              Peripheral Intravenous Line  Duration                  Peripheral IV - Single Lumen 09/02/24 0145 18 G Anterior;Right Forearm 3 days         Peripheral IV - Single Lumen 09/02/24 0400 20 G Anterior;Left Upper Arm 3 days         Peripheral IV - Single Lumen 09/02/24 1300 20 G Anterior;Left Upper Arm 2 days                    Significant Labs:    Lab Results   Component Value Date    WBC 5.17 09/05/2024    HGB 8.7 (L) 09/05/2024    HCT 26.6 (L) 09/05/2024    MCV 84.7 09/05/2024    PLT 65 (L) 09/05/2024           BMP  Lab Results   Component Value Date     09/05/2024    K 3.0 (L) 09/05/2024    CO2 29 09/05/2024    BUN 13.7 09/05/2024    CREATININE 0.71 (L) 09/05/2024    CALCIUM 7.8 (L) 09/05/2024    AGAP 8.0 09/05/2024    EGFRNONAA >60 10/03/2017       ABG  Recent Labs   Lab 09/02/24  1617   PH 7.430   PO2 270.0*   PCO2 41.0   HCO3 27.2*   POCBASEDEF 2.60       Mechanical Ventilation Support:  Vent Mode: CPAP / PSV (09/04/24 0920)  Ventilator Initiated: Yes (09/03/24 1120)  Set Rate: 20 BPM (09/04/24 0814)  Vt Set: 450 mL (09/04/24 0814)  Pressure Support: 10 cmH20 (09/04/24 0920)  PEEP/CPAP: 5 cmH20 (09/04/24 0920)  Oxygen Concentration (%): 30 (09/04/24 0920)  Peak Airway Pressure: 22 cmH20 (09/04/24 0814)  Total Ve: 10.5 L/m (09/04/24 0814)  F/VT Ratio<105 (RSBI): (!) 51.33 (09/04/24 0814)  Significant Imaging:  I have reviewed the pertinent imaging within the past 24 hours.    Assessment/Plan:     Assessment  Acute upper GI bleed - bleeding ulcer in duodenal bulb s/p embolization  Acute blood loss anemia secondary to above  Syncope    Plan  Extubated, off sedation.  Now off vasopressor support, continue to monitor BP   Continue to monitor for bleeding, H/H is stable this AM  Potassium will be replaced   Continue Protonix drip  Upper endoscopy today per GI recs  Continue all ongoing ICU care      DVT:  SCD  GI Prophylaxis:  Protonix     32 minutes of critical care was time spent personally by me on the following activities: development of treatment plan with patient or surrogate and bedside caregivers, discussions with consultants, evaluation of patient's response to treatment, examination of patient, ordering and performing treatments and interventions, ordering and review of laboratory studies, ordering and review of radiographic studies, pulse oximetry, re-evaluation of patient's condition.  This critical care time did not overlap with that of any other provider or involve time for any procedures.     Dieudonne Spears MD  Pulmonary Critical Care Medicine  [facility]  Oncology Acute  DOS: 09/05/2024

## 2024-09-05 NOTE — NURSING
Nurses Note -- 4 Eyes      9/4/2024   10:21 PM      Skin assessed during: Daily Assessment      [x] No Altered Skin Integrity Present    [x]Prevention Measures Documented      [] Yes- Altered Skin Integrity Present or Discovered   [] LDA Added if Not in Epic (Describe Wound)   [] New Altered Skin Integrity was Present on Admit and Documented in LDA   [] Wound Image Taken    Wound Care Consulted? No    Attending Nurse:  NANCY Rose       Second RN/Staff Member:  NANCY Lewis

## 2024-09-05 NOTE — NURSING
Nurses Note -- 4 Eyes      9/5/2024   2:04 PM      Skin assessed during: Transfer      [x] No Altered Skin Integrity Present    [x]Prevention Measures Documented      [] Yes- Altered Skin Integrity Present or Discovered   [] LDA Added if Not in Epic (Describe Wound)   [] New Altered Skin Integrity was Present on Admit and Documented in LDA   [] Wound Image Taken    Wound Care Consulted? No    Attending Nurse:  Anais Chavarria RN/Staff Member:   Kanika

## 2024-09-06 VITALS
BODY MASS INDEX: 25.9 KG/M2 | RESPIRATION RATE: 18 BRPM | SYSTOLIC BLOOD PRESSURE: 122 MMHG | HEART RATE: 76 BPM | DIASTOLIC BLOOD PRESSURE: 80 MMHG | TEMPERATURE: 99 F | HEIGHT: 67 IN | OXYGEN SATURATION: 98 % | WEIGHT: 165 LBS

## 2024-09-06 LAB
ALBUMIN SERPL-MCNC: 2.6 G/DL (ref 3.5–5)
ALBUMIN/GLOB SERPL: 1.3 RATIO (ref 1.1–2)
ALP SERPL-CCNC: 39 UNIT/L (ref 40–150)
ALT SERPL-CCNC: 12 UNIT/L (ref 0–55)
ANION GAP SERPL CALC-SCNC: 8 MEQ/L
AST SERPL-CCNC: 22 UNIT/L (ref 5–34)
BASOPHILS # BLD AUTO: 0.02 X10(3)/MCL
BASOPHILS NFR BLD AUTO: 0.4 %
BILIRUB SERPL-MCNC: 0.5 MG/DL
BUN SERPL-MCNC: 11.4 MG/DL (ref 8.4–25.7)
CALCIUM SERPL-MCNC: 8 MG/DL (ref 8.4–10.2)
CHLORIDE SERPL-SCNC: 105 MMOL/L (ref 98–107)
CO2 SERPL-SCNC: 28 MMOL/L (ref 22–29)
CREAT SERPL-MCNC: 0.67 MG/DL (ref 0.73–1.18)
CREAT/UREA NIT SERPL: 17
EOSINOPHIL # BLD AUTO: 0.31 X10(3)/MCL (ref 0–0.9)
EOSINOPHIL NFR BLD AUTO: 6.4 %
ERYTHROCYTE [DISTWIDTH] IN BLOOD BY AUTOMATED COUNT: 18 % (ref 11.5–17)
GFR SERPLBLD CREATININE-BSD FMLA CKD-EPI: >60 ML/MIN/1.73/M2
GLOBULIN SER-MCNC: 2 GM/DL (ref 2.4–3.5)
GLUCOSE SERPL-MCNC: 100 MG/DL (ref 74–100)
HCT VFR BLD AUTO: 29.5 % (ref 42–52)
HEMATOLOGIST REVIEW: NORMAL
HGB BLD-MCNC: 9.4 G/DL (ref 14–18)
IMM GRANULOCYTES # BLD AUTO: 0.01 X10(3)/MCL (ref 0–0.04)
IMM GRANULOCYTES NFR BLD AUTO: 0.2 %
LYMPHOCYTES # BLD AUTO: 0.64 X10(3)/MCL (ref 0.6–4.6)
LYMPHOCYTES NFR BLD AUTO: 13.3 %
MCH RBC QN AUTO: 27.2 PG (ref 27–31)
MCHC RBC AUTO-ENTMCNC: 31.9 G/DL (ref 33–36)
MCV RBC AUTO: 85.5 FL (ref 80–94)
MONOCYTES # BLD AUTO: 0.42 X10(3)/MCL (ref 0.1–1.3)
MONOCYTES NFR BLD AUTO: 8.7 %
NEUTROPHILS # BLD AUTO: 3.42 X10(3)/MCL (ref 2.1–9.2)
NEUTROPHILS NFR BLD AUTO: 71 %
NRBC BLD AUTO-RTO: 0 %
PLATELET # BLD AUTO: 94 X10(3)/MCL (ref 130–400)
PMV BLD AUTO: 10.4 FL (ref 7.4–10.4)
POTASSIUM SERPL-SCNC: 3.7 MMOL/L (ref 3.5–5.1)
PROT SERPL-MCNC: 4.6 GM/DL (ref 6.4–8.3)
RBC # BLD AUTO: 3.45 X10(6)/MCL (ref 4.7–6.1)
SODIUM SERPL-SCNC: 141 MMOL/L (ref 136–145)
WBC # BLD AUTO: 4.82 X10(3)/MCL (ref 4.5–11.5)

## 2024-09-06 PROCEDURE — 85025 COMPLETE CBC W/AUTO DIFF WBC: CPT | Performed by: INTERNAL MEDICINE

## 2024-09-06 PROCEDURE — 30233K1 TRANSFUSION OF NONAUTOLOGOUS FROZEN PLASMA INTO PERIPHERAL VEIN, PERCUTANEOUS APPROACH: ICD-10-PCS | Performed by: INTERNAL MEDICINE

## 2024-09-06 PROCEDURE — 80053 COMPREHEN METABOLIC PANEL: CPT | Performed by: INTERNAL MEDICINE

## 2024-09-06 PROCEDURE — 63600175 PHARM REV CODE 636 W HCPCS

## 2024-09-06 PROCEDURE — 36415 COLL VENOUS BLD VENIPUNCTURE: CPT | Performed by: INTERNAL MEDICINE

## 2024-09-06 RX ORDER — PANTOPRAZOLE SODIUM 40 MG/1
40 TABLET, DELAYED RELEASE ORAL
Status: DISCONTINUED | OUTPATIENT
Start: 2024-09-06 | End: 2024-09-06 | Stop reason: HOSPADM

## 2024-09-06 RX ORDER — PANTOPRAZOLE SODIUM 40 MG/1
40 TABLET, DELAYED RELEASE ORAL
Qty: 180 TABLET | Refills: 3 | Status: SHIPPED | OUTPATIENT
Start: 2024-09-06 | End: 2025-09-06

## 2024-09-06 RX ADMIN — MUPIROCIN: 20 OINTMENT TOPICAL at 09:09

## 2024-09-06 RX ADMIN — PANTOPRAZOLE SODIUM 40 MG: 40 INJECTION, POWDER, LYOPHILIZED, FOR SOLUTION INTRAVENOUS at 09:09

## 2024-09-06 NOTE — PROGRESS NOTES
Tony Zarate   MRN: [id number]   ADMISSION DATE: 2024  : 1970  AGE: 54 y.o.    DATE :  2024       PROVIDER: ROSALINDA SHELLEY      SUBJECTIVE:  Patient seems to be doing well.  No overt GI bleed.  Feeling much better.    Review of Systems   Constitutional:  Negative for chills and fever.   HENT:  Negative for congestion and nosebleeds.    Eyes:  Negative for redness.   Respiratory:  Negative for cough, chest tightness and shortness of breath.    Cardiovascular:  Negative for chest pain, palpitations and leg swelling.   Gastrointestinal:  Positive for blood in stool (Significant improvement of the GI bleed.  No dez bleeding reported). Negative for abdominal distention, abdominal pain, constipation, diarrhea, nausea and vomiting.   Endocrine: Negative for cold intolerance and polydipsia.   Genitourinary:  Negative for dysuria and flank pain.   Musculoskeletal:  Negative for arthralgias.   Skin:  Negative for pallor.   Allergic/Immunologic: Negative for food allergies.   Neurological:  Negative for dizziness and headaches.   Hematological:  Does not bruise/bleed easily.   Psychiatric/Behavioral:  Negative for agitation and confusion.         Review of patient's allergies indicates:  No Known Allergies      mupirocin   Nasal BID    pantoprazole  40 mg Intravenous BID       Medications Discontinued During This Encounter   Medication Reason    0.9%  NaCl infusion (for blood administration)     0.9%  NaCl infusion (for blood administration)     0.9%  NaCl infusion (for blood administration)     0.9%  NaCl infusion (for blood administration)     0.9%  NaCl infusion (for blood administration)     0.9%  NaCl infusion (for blood administration)     0.9%  NaCl infusion (for blood administration)     0.9%  NaCl infusion (for blood administration)     0.9%  NaCl infusion (for blood administration)     0.9%  NaCl infusion (for blood administration)     0.9%  NaCl infusion (for blood administration)     0.9%  NaCl  infusion (for blood administration)     0.9%  NaCl infusion (for blood administration)     0.9%  NaCl infusion (for blood administration)     0.9%  NaCl infusion (for blood administration)     0.9%  NaCl infusion (for blood administration)     EPINEPHrine (ADRENALIN) 0.1 mg/mL injection Returned to ADS    pantoprazole (PROTONIX) 40 mg in 0.9% NaCl 100 mL IVPB (MB+)     lactated ringers infusion     propofol (DIPRIVAN) 10 mg/mL infusion     NORepinephrine 8 mg in dextrose 5% 250 mL infusion                History reviewed. No pertinent past medical history.   Social History     Socioeconomic History    Marital status:      Social Determinants of Health     Financial Resource Strain: Low Risk  (9/4/2024)    Overall Financial Resource Strain (CARDIA)     Difficulty of Paying Living Expenses: Not hard at all   Food Insecurity: No Food Insecurity (9/4/2024)    Hunger Vital Sign     Worried About Running Out of Food in the Last Year: Never true     Ran Out of Food in the Last Year: Never true   Transportation Needs: No Transportation Needs (9/4/2024)    TRANSPORTATION NEEDS     Transportation : No   Physical Activity: Inactive (9/4/2024)    Exercise Vital Sign     Days of Exercise per Week: 0 days     Minutes of Exercise per Session: 0 min   Stress: No Stress Concern Present (9/4/2024)    [employer] [place] [employer] - Occupational Stress Questionnaire     Feeling of Stress : Not at all   Housing Stability: Low Risk  (9/4/2024)    Housing Stability Vital Sign     Unable to Pay for Housing in the Last Year: No     Homeless in the Last Year: No      Past Surgical History:   Procedure Laterality Date    EGD, WITH HEMORRHAGE CONTROL  9/2/2024    Procedure: EGD,WITH HEMORRHAGE CONTROL;  Surgeon: Jason Napier MD;  Location: [facility] ENDOSCOPY;  Service: Gastroenterology;;  5cc epi    ESOPHAGOGASTRODUODENOSCOPY N/A 9/2/2024    Procedure: EGD (ESOPHAGOGASTRODUODENOSCOPY);  Surgeon: Jason Napier MD;  Location:  [facility] ENDOSCOPY;  Service: Gastroenterology;  Laterality: N/A;    ESOPHAGOGASTRODUODENOSCOPY N/A 9/3/2024    Procedure: EGD (ESOPHAGOGASTRODUODENOSCOPY);  Surgeon: Jason Napier MD;  Location: [facility] ENDOSCOPY;  Service: Gastroenterology;  Laterality: N/A;  09/03/24: Anesthesia needed        OBJECTIVE:     Vitals:    09/05/24 2346 09/06/24 0435 09/06/24 0759 09/06/24 1114   BP: 116/73 104/62 112/69 120/78   BP Location:       Patient Position:       Pulse: 69 68 67 69   Resp:       Temp: 98.7 °F (37.1 °C) 98.3 °F (36.8 °C) 98.4 °F (36.9 °C) 98 °F (36.7 °C)   TempSrc: Oral Oral Oral Oral   SpO2: 96% 95% 96% 97%   Weight:       Height:           Physical Exam  HENT:      Head: Normocephalic and atraumatic.      Nose: Nose normal.      Mouth/Throat:      Pharynx: Oropharynx is clear.   Eyes:      Extraocular Movements: Extraocular movements intact.      Conjunctiva/sclera: Conjunctivae normal.      Pupils: Pupils are equal, round, and reactive to light.   Cardiovascular:      Rate and Rhythm: Normal rate and regular rhythm.   Pulmonary:      Effort: Pulmonary effort is normal.      Breath sounds: Normal breath sounds.   Abdominal:      General: Bowel sounds are normal.      Palpations: Abdomen is soft.   Musculoskeletal:         General: Normal range of motion.      Cervical back: Neck supple.   Skin:     General: Skin is warm and dry.   Neurological:      Mental Status: He is alert and oriented to person, place, and time.   Psychiatric:         Mood and Affect: Mood normal.            LABS    Recent Labs   Lab 09/05/24  0210 09/05/24  1058 09/06/24  0631   WBC 5.17 4.83 4.82   HGB 8.7* 10.3* 9.4*   HCT 26.6* 31.4* 29.5*   PLT 65* 88* 94*      Recent Labs   Lab 09/04/24  0342 09/05/24  0210 09/06/24  0443    145 141   K 3.2* 3.0* 3.7   * 108* 105   CO2 26 29 28   BUN 17.1 13.7 11.4   CREATININE 0.76 0.71* 0.67*   CALCIUM 7.5* 7.8* 8.0*   BILITOT 0.4 0.5 0.5   ALKPHOS 39* 42 39*   ALT 12 10 12   AST  "16 15 22   GLUCOSE 103* 98 100      Recent Labs   Lab 09/05/24  1058   INR 1.0    No results for input(s): "AMYLASE" in the last 168 hours.   Recent Labs   Lab 09/01/24  1741 09/02/24  0730 09/03/24  0454 09/05/24  1058   APTT  --  24.0 29.2 27.7   INR 1.1  --  1.2 1.0           RESULTS: IR Embolization    Result Date: 9/3/2024  PROCEDURE: Mesenteric Angiography with GDA Branch Coil Embolization CLINICAL HISTORY: 54-year-old male with upper GI bleed and duodenal hemorrhage ANESTHESIA: Level of sedation: General Anesthesia Antibiotic prophylaxis: Ancef 2 g IV PHYSICIANS: Will Costa MD RADIATION DOSE: Fluoroscopy time: 23.3 minutes Radiation exposure dose: 709.95 mGy Exposure images: 478 CONTRAST: 150 cc of Isovue 370 PROCEDURAL SUMMARY: The procedure was deemed emergent for consent purposes.  The patient was placed supine on the angiography table. The skin overlying the right groin was then prepped and draped in the usual sterile fashion. The skin and soft tissues were anesthetized using 1% lidocaine.  The bony landmarks were confirmed under fluoroscopy.  Under real time ultrasound guidance, a 21g micropuncture needle was advanced into the common femoral artery. Return of blood flow was confirmed and a mandril wire was advanced into the needle under fluoroscopic guidance. The needle was removed and a microdilator was advanced over the wire into the artery.  The inner dilator and wire were removed and a [employer] guidewire was advanced through the sheath into the artery under fluoroscopic guidance.  The sheath was removed. A 5F vascular sheath was advanced into the common femoral artery and the inner dilator was removed.  The sheath was flushed. A 5F [place] Cobra catheter was advance through the sheath over the wire into the arterial system.  The celiac and superior mesenteric artery were selected.  Angiography demonstrated normal vascular anatomy.  The GDA was difficult to opacify from the celiac artery.  The glide " Cobra catheter was exchanged for a Reed catheter over a Bentson guidewire. A decision was made to proceed with embolization. A coaxial system of a TruSelect microcatheter with a Fathom microwire was inserted through the select catheter. The microcatheter and microwire were used to select the gastroduodenal artery.  A superior branch of the gastroduodenal artery (superior pancreaticoduodenal) was selected and angiography was performed.  Angiography raise concern for active extravasation and vessel spasm.  Angiography confirmed positioning within the targeted vessel.  A 2 mm x 4 cm Embold coil was deployed within the suspected vessel.  Post embolization angiography confirms stasis within the target vessel.  The microcatheter was used to further investigate the gastroduodenal branches from the celiac.  A 2nd superior pancreaticoduodenal branch was selected.  Angiography demonstrated concern for distal pseudoaneurysm arising from this branch.  A 2 mm x 6 cm Embold coil was deployed in the second target vessel with post embolization angiography confirming stasis. Attention was then turned to the superior mesenteric artery.  The Reed catheter was removed over a Bentson guidewire and the glide Cobra catheter was reinserted.  This was used to select the superior mesenteric artery.  Angiography was performed within the SMA.  A definitive pseudoaneurysm was seen arising from a pancreaticoduodenal branch.  The microcatheter microwire were reinserted and used to select the target vessel.  A 2 mm x 4 cm Embold coil was deployed within the vessel.  After deployment, contrast extravasation was noted.  A second Embold coil was deployed within this target vessel measuring 2 mm x 6 cm.  Post embolization angiography confirms stasis through the targeted vessel. Once embolization was complete, angiography was performed through the microcatheter confirming technically successful occlusion of the targeted branch.  The  microcatheter was removed and angiography was repeated through parent catheter.  This confirmed successful embolization of the target vessel with no other vascular abnormality identified.  The catheter was then removed over a [employer] guidewire.  Celiac angiography was repeated to ensure no further bleeding vessels are seen in the region of the duodenum.  All catheters were then removed over a wire. The groin sheath was flushed.  Angiography of the common femoral artery was performed via the vascular sheath.  Angiography of the common femoral artery demonstrated no evidence of injury. A Mynx closure device was deployed for hemostasis and the sheath was removed.  A sterile dressing was applied. The patient tolerated the procedure well and experienced no immediate complications. The patient was then brought to the recovery room in good condition.     Technically successful Coli Embolization of the duodenal bleed as visualized on angiography. Electronically signed by: Will Costa Date:    09/03/2024 Time:    15:48    X-Ray Chest 1 View    Result Date: 9/3/2024  EXAMINATION: XR CHEST 1 VIEW CLINICAL HISTORY: SOB; TECHNIQUE: Single frontal view of the chest was performed. COMPARISON: 09/02/2024 FINDINGS: LINES AND TUBES: Endotracheal tube projects over the trachea with tip above the samir.  EKG/telemetry leads overlie the chest. MEDIASTINUM AND LORRIE: The cardiac silhouette is normal. LUNGS: No lobar consolidation. No edema. PLEURA:No pleural effusion. No pneumothorax. OTHER: No acute osseous abnormality.     No acute cardiopulmonary abnormality. Electronically signed by: Jayla Santiago Date:    09/03/2024 Time:    14:25    X-Ray Chest 1 View    Result Date: 9/3/2024  EXAMINATION: XR CHEST 1 VIEW CLINICAL HISTORY: acute lung ds; COMPARISON: Earlier today FINDINGS: Frontal view of the chest was obtained. Interval extubation.  Right-sided central line tip overlies the distal SVC.  The heart is not enlarged.  Grossly  clear lungs.  No pneumothorax.     Interval extubation with placement of a right-sided central line.  No acute findings otherwise. Electronically signed by: Yinka Hendrickson Date:    09/03/2024 Time:    11:09    X-Ray Chest 1 View    Result Date: 9/2/2024  EXAMINATION: XR CHEST 1 VIEW CLINICAL HISTORY: tube placement; TECHNIQUE: Frontal view(s) of the chest. COMPARISON: No relevant comparison studies available at the time of dictation. FINDINGS: Endotracheal tube tip lies 3.5 cm above the samir.  Normal cardiac silhouette.  The lungs are well-inflated.  No consolidation identified.  No significant pleural effusion or discernible pneumothorax.     Endotracheal tube tip 3.5 cm above the samir. Electronically signed by: Eyad Torres Date:    09/02/2024 Time:    13:30    CT Abdomen Pelvis With IV Contrast NO Oral Contrast    Result Date: 9/1/2024  EXAMINATION: CT of the abdomen pelvis with contrast CLINICAL HISTORY: Rectal bleeding COMPARISON: 08/08/2024 TECHNIQUE: Routine CT of the abdomen pelvis performed with intravenous contrast Total DLP: 447.45  mGy.cm Automatic exposure control was utilized to reduce the patient's dose FINDINGS: The visualized lung bases are clear. The liver, spleen, pancreas, gallbladder, adrenal glands, and right kidney are unremarkable.  There is a simple cyst in the inferior pole of the left kidney that measures 0.9 cm.  No hydronephrosis.  No perinephric collection.  The abdominal aorta normal caliber.  No abdominopelvic adenopathy.  There is right inguinal hernia containing portion of small bowel loops without evidence for high-grade obstruction or bowel inflammatory changes.  No free air, free fluid, fluid collection.  Bladder contours are normal. No osseous destructive process.     No acute intra-abdominal intrapelvic abnormality. Bowel containing right inguinal hernia without evidence for obstruction or inflammatory changes. Electronically signed by: Dominic Fields MD Date:    09/01/2024  Time:    19:14    CT Renal Stone Study ABD Pelvis WO    Result Date: 8/8/2024  EXAMINATION: CT RENAL STONE STUDY ABD PELVIS WO CLINICAL HISTORY: Flank pain, kidney stone suspected; TECHNIQUE: CT imaging was performed of the abdomen and pelvis without intravenous contrast. Dose length product is 484 mGycm. Automatic exposure control, adjustment of mA/kV or iterative reconstruction technique was used to limit radiation dose. COMPARISON: None FINDINGS: Assessment of the visceral organs and vasculature is limited by the lack of IV contrast. Liver/biliary: No concerning hepatic findings. No radiodense gallstone or biliary dilatation appreciated. Pancreas: Normal. Spleen: Normal. Adrenals: Normal. Kidneys, ureters and bladder: There is a 3 mm calculus in the distal left ureter.  There is mild left hydronephrosis with perinephric stranding.  The bladder is within normal limits. Reproductive organs: No pelvic masses. Stomach/bowel: No evidence of bowel obstruction. Normal appendix. No discernible bowel inflammation. Lymph nodes: No pathologically enlarged lymph node identified with noncontrast technique. Peritoneum: No ascites or free air. Vessels: Mild scattered vascular calcifications. Abdominal wall: Right inguinal hernia containing a loop of the terminal ileum. Lung bases: No consolidation or pleural effusion. Bones: No acute osseous findings.     1. 3 mm calculus in the distal left ureter with mild left hydronephrosis and perinephric inflammatory changes. 2. Bowel containing right inguinal hernia without obstruction. Electronically signed by: Zakiya Batres Date:    08/08/2024 Time:    07:10             ICD-10-CM ICD-9-CM   1. Acute blood loss anemia  D62 285.1   2. GIB (gastrointestinal bleeding)  K92.2 578.9          ASSESSMENT & PLAN:   Tony Zarate is a 54 y.o. male who presents to the ED for rectal bleeding.  Patient reports it started approximately 4:00 a.m. in the morning.  He states multiple episodes of  bloody stool.  He also reports passing out 3 times a day.  He denies any fevers or abdominal pain.  He was not on blood thinners.  Patient was never experienced similar episode in the past.       Patient has been taking some pain medication for ureteric stone.  Initially patient said that he was taking Advil.  Later he said it was probably Tylenol.  There seemed to be some confusion about the pain meds.  He described dark stools and significant amount of diarrhea since this morning.  Also had syncopal episode as above.  The patient was initially seen at outside Hospital and was transferred to the floor at [facility] from emergency room.  I was informed by this patient by Dr. Nash about 30 minutes ago when he met me on the floor.  Type and screen has been ordered and is in the process starting packed RBC transfusion.     Recommendations:   1. IV PPI infusion.    2. Avoid NSAIDs or anticoagulation.    3. Patient is in the process of receiving 2 units of packed RBC transfusion.  In fact I will go ahead and recommend an additional unit and make total of 3 units of packed RBC.  4. He is in the process of getting transferred to ICU.  Hemoglobin was around 16 a month ago.  He has had a significant drop of hemoglobin along with syncopal episode.  This seems to be consistent with a acute upper GI bleed.  He would need upper endoscopy in the next 12-24 hours after volume resuscitation with packed RBC as well as fluid.  4. I have discussed above plan with the patient and his family at the bedside in details.  They are in agreement.  Upper endoscopy will be performed tomorrow morning.  This can be done earlier if necessary.    9/4/24  Patient seems to be doing well from GI standpoint.  Status post embolization of multiple pancreaticoduodenal branches per Interventional Radiology.  Hemodynamically stable.  Extubated today.  IV ppi.  Obtain stool for H pylori antigen.  Treat if positive.  Sparingly clears as per  ICU team.  To be advanced gradually.  Appreciate Interventional Radiology input and intervention.    9/5/24  Events noted.  Patient denies any significant abdominal pain.  Reported have some dark stools.  To the floor today.  Continue with PPI along with Carafate.  Follow up stool for H pylori antigen.  Treat if positive.  Liquid diet for now.  To be gradually increase as tolerated.  Avoid NSAIDs or anticoagulation.    Addendum: Above discussed with the patient and his wife in detail at the bedside.  Questions were answered.    9/6/24  Patient seems to be doing well from GI standpoint.  No overt GI bleed.  Hemodynamically stable.  Continue PPI along with Carafate.  Avoid anticoagulation.  No NSAIDs.  Active duodenal ulcer with bleed refractory to endoscopic treatment.  Required intervention per Interventional Radiology to achieve hemostasis.  H pylori stool antigen was negative.  Advance to bland diet as tolerated.    Addendum: Can follow up with me on discharge in 2-3 weeks if he so desires

## 2024-09-06 NOTE — NURSING
Pt discharged home with family, received discharge instructions on medications and diet, pt instructed to call Dr. Napier's office on Monday to schedule follow up appointment, no distress noted upon discharge.

## 2024-09-06 NOTE — NURSING
Nurses Note -- 4 Eyes      9/6/2024   8:24 AM      Skin assessed during: Q Shift Change      [x] No Altered Skin Integrity Present    []Prevention Measures Documented      [] Yes- Altered Skin Integrity Present or Discovered   [] LDA Added if Not in Epic (Describe Wound)   [] New Altered Skin Integrity was Present on Admit and Documented in LDA   [] Wound Image Taken    Wound Care Consulted? No    Attending Nurse:  Arabella Chavarria RN/Staff Member: Wayne

## 2024-09-06 NOTE — DISCHARGE SUMMARY
[facility] Medicine Discharge Summary    Admit Date: 9/1/2024  Discharge Date and Time: 9/6/20243:06 PM  Admitting Physician: FREDI Team  Discharging Physician: Florencio Lopez MD.  Primary Care Physician: Mirlande Zarate NP  Consults: Gastroenterology, IR    Discharge Diagnoses:  Acute blood loss anemia from duodenal ulcer: stable     Hospital Course:   Patient is a 54-year-old male with no chronic medical conditions presented to an outside ER with bloody bowel movements multiple times a day over the preceding 12 hours and a syncopal episode. At the prior facility workup showed a drop in his hemoglobin from 16 to 9 and a request was made to transfer to this facility for further care. CT scan of the abdomen showed no acute process. Patient had an additional syncopal episode on arrival and an episode of hematemesis. Patient reports no excessive NSAID use, no alcohol use. Denies a history of GI bleeding in the past.   Patient required 9 units PRBCs, 4 FFP 2 platelets, and 10 units of cryo,     Patient seen by GI team and he had a EGD done that showed   1.  Duodenal ulcer, apex of the duodenal bulb with visible vessel with evidence of active oozing (Herbie 1b).  Sclerotherapy/cautery was done with good hemostasis.    2. Small hiatal hernia.  Otherwise normal exam    IR was consulted and he had GDA Branch Coil Embolization. Patient was continued on iv PPI. Diet was slowly advanced. He was downgraded from ICU and he was doing well. He was stable and asymptomatic. H&H stable.   He was placed on PO PPI BID dose and was discharged home in a stable condition.   He will f/u in GI clinic as scheduled.     Pt was seen and examined on the day of discharge  Vitals:  VITAL SIGNS: 24 HRS MIN & MAX LAST   Temp  Min: 98 °F (36.7 °C)  Max: 98.7 °F (37.1 °C) 98 °F (36.7 °C)   BP  Min: 103/65  Max: 120/78 120/78   Pulse  Min: 67  Max: 69  69   No data recorded 18   SpO2  Min: 95 %  Max: 98 %  97 %       Physical Exam:  Heart RRR  Lungs clear   Abdomen soft and non tender   Neuro: No FND      Procedures Performed: No admission procedures for hospital encounter.     Significant Diagnostic Studies: See Full reports for all details    Recent Labs   Lab 09/05/24  0210 09/05/24  1058 09/06/24  0631   WBC 5.17 4.83 4.82   RBC 3.14* 3.67* 3.45*   HGB 8.7* 10.3* 9.4*   HCT 26.6* 31.4* 29.5*   MCV 84.7 85.6 85.5   MCH 27.7 28.1 27.2   MCHC 32.7* 32.8* 31.9*   RDW 18.5* 18.6* 18.0*   PLT 65* 88* 94*   MPV 10.1 10.6* 10.4       Recent Labs   Lab 09/02/24  1545 09/02/24  1617 09/03/24  0454 09/04/24  0342 09/05/24  0210 09/06/24  0443   NA  --   --    < > 143 145 141   K  --   --    < > 3.2* 3.0* 3.7   CL  --   --    < > 113* 108* 105   CO2  --   --    < > 26 29 28   BUN  --   --    < > 17.1 13.7 11.4   CREATININE  --   --    < > 0.76 0.71* 0.67*   CALCIUM  --   --    < > 7.5* 7.8* 8.0*   PH 7.360 7.430  --   --   --   --    ALBUMIN  --   --    < > 2.4* 2.4* 2.6*   ALKPHOS  --   --    < > 39* 42 39*   ALT  --   --    < > 12 10 12   AST  --   --    < > 16 15 22   BILITOT  --   --    < > 0.4 0.5 0.5    < > = values in this interval not displayed.        Microbiology Results (last 7 days)       ** No results found for the last 168 hours. **             IR Embolization  Narrative: PROCEDURE:  Mesenteric Angiography with GDA Branch Coil Embolization    CLINICAL HISTORY:  54-year-old male with upper GI bleed and duodenal hemorrhage    ANESTHESIA:  Level of sedation: General Anesthesia    Antibiotic prophylaxis: Ancef 2 g IV    PHYSICIANS:  Will Costa MD    RADIATION DOSE:  Fluoroscopy time: 23.3 minutes    Radiation exposure dose: 709.95 mGy    Exposure images: 478    CONTRAST:  150 cc of Isovue 370    PROCEDURAL SUMMARY:  The procedure was deemed emergent for consent purposes.  The patient was placed supine on the angiography table. The skin overlying the right groin was then prepped and draped in the usual sterile  fashion.    The skin and soft tissues were anesthetized using 1% lidocaine.  The bony landmarks were confirmed under fluoroscopy.  Under real time ultrasound guidance, a 21g micropuncture needle was advanced into the common femoral artery. Return of blood flow was confirmed and a mandril wire was advanced into the needle under fluoroscopic guidance. The needle was removed and a microdilator was advanced over the wire into the artery.  The inner dilator and wire were removed and a Bentson guidewire was advanced through the sheath into the artery under fluoroscopic guidance.  The sheath was removed. A 5F vascular sheath was advanced into the common femoral artery and the inner dilator was removed.  The sheath was flushed.    A 5F [place] Cobra catheter was advance through the sheath over the wire into the arterial system.  The celiac and superior mesenteric artery were selected.  Angiography demonstrated normal vascular anatomy.  The GDA was difficult to opacify from the celiac artery.  The glide Cobra catheter was exchanged for a Reed catheter over a Bentson guidewire.    A decision was made to proceed with embolization. A coaxial system of a TruSelect microcatheter with a Fathom microwire was inserted through the select catheter. The microcatheter and microwire were used to select the gastroduodenal artery.  A superior branch of the gastroduodenal artery (superior pancreaticoduodenal) was selected and angiography was performed.  Angiography raise concern for active extravasation and vessel spasm.  Angiography confirmed positioning within the targeted vessel.  A 2 mm x 4 cm Embold coil was deployed within the suspected vessel.  Post embolization angiography confirms stasis within the target vessel.  The microcatheter was used to further investigate the gastroduodenal branches from the celiac.  A 2nd superior pancreaticoduodenal branch was selected.  Angiography demonstrated concern for distal pseudoaneurysm  arising from this branch.  A 2 mm x 6 cm Embold coil was deployed in the second target vessel with post embolization angiography confirming stasis.    Attention was then turned to the superior mesenteric artery.  The Reed catheter was removed over a Bentson guidewire and the glide Cobra catheter was reinserted.  This was used to select the superior mesenteric artery.  Angiography was performed within the SMA.  A definitive pseudoaneurysm was seen arising from a pancreaticoduodenal branch.  The microcatheter microwire were reinserted and used to select the target vessel.  A 2 mm x 4 cm Embold coil was deployed within the vessel.  After deployment, contrast extravasation was noted.  A second Embold coil was deployed within this target vessel measuring 2 mm x 6 cm.  Post embolization angiography confirms stasis through the targeted vessel.    Once embolization was complete, angiography was performed through the microcatheter confirming technically successful occlusion of the targeted branch.  The microcatheter was removed and angiography was repeated through parent catheter.  This confirmed successful embolization of the target vessel with no other vascular abnormality identified.  The catheter was then removed over a Bentson guidewire.  Celiac angiography was repeated to ensure no further bleeding vessels are seen in the region of the duodenum.  All catheters were then removed over a wire.    The groin sheath was flushed.  Angiography of the common femoral artery was performed via the vascular sheath.  Angiography of the common femoral artery demonstrated no evidence of injury. A Mynx closure device was deployed for hemostasis and the sheath was removed.  A sterile dressing was applied.    The patient tolerated the procedure well and experienced no immediate complications. The patient was then brought to the recovery room in good condition.  Impression: Technically successful Coli Embolization of the duodenal  bleed as visualized on angiography.    Electronically signed by: Will Costa  Date:    09/03/2024  Time:    15:48  X-Ray Chest 1 View  Narrative: EXAMINATION:  XR CHEST 1 VIEW    CLINICAL HISTORY:  SOB;    TECHNIQUE:  Single frontal view of the chest was performed.    COMPARISON:  09/02/2024    FINDINGS:  LINES AND TUBES: Endotracheal tube projects over the trachea with tip above the samir.  EKG/telemetry leads overlie the chest.    MEDIASTINUM AND LORRIE: The cardiac silhouette is normal.    LUNGS: No lobar consolidation. No edema.    PLEURA:No pleural effusion. No pneumothorax.    OTHER: No acute osseous abnormality.  Impression: No acute cardiopulmonary abnormality.    Electronically signed by: Jayla Santiago  Date:    09/03/2024  Time:    14:25  X-Ray Chest 1 View  Narrative: EXAMINATION:  XR CHEST 1 VIEW    CLINICAL HISTORY:  acute lung ds;    COMPARISON:  Earlier today    FINDINGS:  Frontal view of the chest was obtained. Interval extubation.  Right-sided central line tip overlies the distal SVC.  The heart is not enlarged.  Grossly clear lungs.  No pneumothorax.  Impression: Interval extubation with placement of a right-sided central line.  No acute findings otherwise.    Electronically signed by: Yinka Hendrickson  Date:    09/03/2024  Time:    11:09         Medication List        START taking these medications      pantoprazole 40 MG tablet  Commonly known as: PROTONIX  Take 1 tablet (40 mg total) by mouth 2 (two) times daily before meals.            CONTINUE taking these medications      tamsulosin 0.4 mg Cap  Commonly known as: FLOMAX  Take 1 capsule (0.4 mg total) by mouth once daily.            STOP taking these medications      ondansetron 4 MG tablet  Commonly known as: ZOFRAN               Where to Get Your Medications        These medications were sent to [id number] - [place], [place]  [address]  AT [facility] [address]  [address]  [place] [facility] [address]      Phone:  [phone]   pantoprazole 40 MG tablet          Explained in detail to the patient about the discharge plan, medications, and follow-up visits. Pt understands and agrees with the treatment plan  Discharge Disposition: Home   Discharged Condition: stable  Diet-   Dietary Orders (From admission, onward)       Start     Ordered    09/06/24 1506  Diet Bariatric Soft  Diet effective now         09/06/24 1505                   Medications Per DC med rec  Activities as tolerated    For further questions contact hospitalist office    Discharge time 33 minutes    For worsening symptoms, chest pain, shortness of breath, increased abdominal pain, high grade fever, stroke or stroke like symptoms, immediately go to the nearest Emergency Room or call 911 as soon as possible.      Florencio Miranda M.D on 9/6/2024. at 3:06 PM.

## 2024-09-08 NOTE — ANESTHESIA POSTPROCEDURE EVALUATION
Anesthesia Post Evaluation    Patient: Tony Zarate    Procedure(s) Performed: Procedure(s) (LRB):  EGD (ESOPHAGOGASTRODUODENOSCOPY) (N/A)  EGD,WITH HEMORRHAGE CONTROL    Final Anesthesia Type: general      Patient location during evaluation: PACU  Patient participation: Yes- Able to Participate  Level of consciousness: awake and alert  Post-procedure vital signs: reviewed and stable  Pain management: adequate  Airway patency: patent      Anesthetic complications: no      Cardiovascular status: blood pressure returned to baseline  Respiratory status: unassisted  Hydration status: euvolemic  Follow-up not needed.              Vitals Value Taken Time   /80 09/06/24 1635   Temp 37.1 °C (98.7 °F) 09/06/24 1635   Pulse 76 09/06/24 1635   Resp 16 09/05/24 1205   SpO2 98 % 09/06/24 1635   Vitals shown include unfiled device data.      No case tracking events are documented in the log.      Pain/Jimbo Score: No data recorded

## 2024-09-30 ENCOUNTER — HOSPITAL ENCOUNTER (OUTPATIENT)
Dept: RADIOLOGY | Facility: HOSPITAL | Age: 54
Discharge: HOME | End: 2024-09-30
Attending: RADIOLOGY
Payer: COMMERCIAL

## 2024-09-30 DIAGNOSIS — Z87.19 H/O: GI BLEED: ICD-10-CM

## 2024-09-30 PROCEDURE — 74177 CT ABD & PELVIS W/CONTRAST: CPT | Mod: TC

## 2024-09-30 PROCEDURE — 25500020 PHARM REV CODE 255: Performed by: RADIOLOGY

## 2024-09-30 RX ADMIN — IOHEXOL 100 ML: 350 INJECTION, SOLUTION INTRAVENOUS at 07:09

## 2024-10-05 ENCOUNTER — LAB VISIT (OUTPATIENT)
Dept: LAB | Facility: HOSPITAL | Age: 54
End: 2024-10-05
Attending: NURSE PRACTITIONER
Payer: COMMERCIAL

## 2024-10-05 DIAGNOSIS — D50.9 IRON DEFICIENCY ANEMIA, UNSPECIFIED: Primary | ICD-10-CM

## 2024-10-05 LAB
FERRITIN SERPL-MCNC: 100.5 NG/ML (ref 21.81–274.66)
IRON SATN MFR SERPL: 24 % (ref 20–50)
IRON SERPL-MCNC: 68 UG/DL (ref 65–175)
TIBC SERPL-MCNC: 221 UG/DL (ref 69–240)
TIBC SERPL-MCNC: 289 UG/DL (ref 250–450)
TRANSFERRIN SERPL-MCNC: 267 MG/DL (ref 174–364)

## 2024-10-05 PROCEDURE — 82728 ASSAY OF FERRITIN: CPT

## 2024-10-05 PROCEDURE — 83540 ASSAY OF IRON: CPT

## 2024-10-05 PROCEDURE — 36415 COLL VENOUS BLD VENIPUNCTURE: CPT

## 2024-10-05 PROCEDURE — 83550 IRON BINDING TEST: CPT

## 2024-10-22 ENCOUNTER — PATIENT MESSAGE (OUTPATIENT)
Dept: RESEARCH | Facility: HOSPITAL | Age: 54
End: 2024-10-22
Payer: COMMERCIAL

## 2024-11-04 ENCOUNTER — LAB VISIT (OUTPATIENT)
Dept: LAB | Facility: HOSPITAL | Age: 54
End: 2024-11-04
Attending: INTERNAL MEDICINE
Payer: COMMERCIAL

## 2024-11-04 DIAGNOSIS — K25.9 STOMACH ULCER DUE TO NONSTEROIDAL ANTI-INFLAMMATORY DRUG (NSAID): ICD-10-CM

## 2024-11-04 DIAGNOSIS — T39.395A STOMACH ULCER DUE TO NONSTEROIDAL ANTI-INFLAMMATORY DRUG (NSAID): ICD-10-CM

## 2024-11-04 DIAGNOSIS — Z12.11 SCREEN FOR COLON CANCER: ICD-10-CM

## 2024-11-04 DIAGNOSIS — K27.4 PEPTIC ULCER DISEASE WITH HEMORRHAGE: Primary | ICD-10-CM

## 2024-11-04 LAB
ANION GAP SERPL CALC-SCNC: 8 MEQ/L
BASOPHILS # BLD AUTO: 0.02 X10(3)/MCL
BASOPHILS NFR BLD AUTO: 0.4 %
BUN SERPL-MCNC: 16.7 MG/DL (ref 8.4–25.7)
CALCIUM SERPL-MCNC: 9.9 MG/DL (ref 8.4–10.2)
CHLORIDE SERPL-SCNC: 107 MMOL/L (ref 98–107)
CO2 SERPL-SCNC: 29 MMOL/L (ref 22–29)
CREAT SERPL-MCNC: 0.83 MG/DL (ref 0.72–1.25)
CREAT/UREA NIT SERPL: 20
EOSINOPHIL # BLD AUTO: 0.05 X10(3)/MCL (ref 0–0.9)
EOSINOPHIL NFR BLD AUTO: 1.1 %
ERYTHROCYTE [DISTWIDTH] IN BLOOD BY AUTOMATED COUNT: 13.2 % (ref 11.5–17)
GFR SERPLBLD CREATININE-BSD FMLA CKD-EPI: >60 ML/MIN/1.73/M2
GLUCOSE SERPL-MCNC: 91 MG/DL (ref 74–100)
HCT VFR BLD AUTO: 46.1 % (ref 42–52)
HGB BLD-MCNC: 15.1 G/DL (ref 14–18)
IMM GRANULOCYTES # BLD AUTO: 0.01 X10(3)/MCL (ref 0–0.04)
IMM GRANULOCYTES NFR BLD AUTO: 0.2 %
LYMPHOCYTES # BLD AUTO: 1.16 X10(3)/MCL (ref 0.6–4.6)
LYMPHOCYTES NFR BLD AUTO: 25.5 %
MCH RBC QN AUTO: 28.1 PG (ref 27–31)
MCHC RBC AUTO-ENTMCNC: 32.8 G/DL (ref 33–36)
MCV RBC AUTO: 85.8 FL (ref 80–94)
MONOCYTES # BLD AUTO: 0.38 X10(3)/MCL (ref 0.1–1.3)
MONOCYTES NFR BLD AUTO: 8.4 %
NEUTROPHILS # BLD AUTO: 2.93 X10(3)/MCL (ref 2.1–9.2)
NEUTROPHILS NFR BLD AUTO: 64.4 %
NRBC BLD AUTO-RTO: 0 %
PLATELET # BLD AUTO: 175 X10(3)/MCL (ref 130–400)
PMV BLD AUTO: 10.5 FL (ref 7.4–10.4)
POTASSIUM SERPL-SCNC: 4.3 MMOL/L (ref 3.5–5.1)
RBC # BLD AUTO: 5.37 X10(6)/MCL (ref 4.7–6.1)
SODIUM SERPL-SCNC: 144 MMOL/L (ref 136–145)
WBC # BLD AUTO: 4.55 X10(3)/MCL (ref 4.5–11.5)

## 2024-11-04 PROCEDURE — 80048 BASIC METABOLIC PNL TOTAL CA: CPT

## 2024-11-04 PROCEDURE — 85025 COMPLETE CBC W/AUTO DIFF WBC: CPT

## 2024-11-04 PROCEDURE — 36415 COLL VENOUS BLD VENIPUNCTURE: CPT

## (undated) DEVICE — COLLECTION SPECIMEN NEPTUNE

## (undated) DEVICE — KIT CANIST SUCTION 1200CC

## (undated) DEVICE — TUBING O2 FEMALE CONN 13FT

## (undated) DEVICE — TIP SUCTION YANKAUER

## (undated) DEVICE — CATH INJECTION GOLD PROBE 7FR

## (undated) DEVICE — KIT SURGICAL COLON .25 1.1OZ

## (undated) DEVICE — SOL IRRI STRL WATER 1000ML